# Patient Record
Sex: FEMALE | Race: WHITE | Employment: FULL TIME | URBAN - METROPOLITAN AREA
[De-identification: names, ages, dates, MRNs, and addresses within clinical notes are randomized per-mention and may not be internally consistent; named-entity substitution may affect disease eponyms.]

---

## 2019-01-15 ENCOUNTER — HOSPITAL ENCOUNTER (INPATIENT)
Facility: CLINIC | Age: 54
LOS: 4 days | Discharge: HOME | DRG: 853 | End: 2019-01-19
Attending: EMERGENCY MEDICINE | Admitting: INTERNAL MEDICINE
Payer: COMMERCIAL

## 2019-01-15 DIAGNOSIS — N17.9 ACUTE RENAL FAILURE, UNSPECIFIED ACUTE RENAL FAILURE TYPE (H): ICD-10-CM

## 2019-01-15 DIAGNOSIS — I10 BENIGN ESSENTIAL HYPERTENSION: Primary | ICD-10-CM

## 2019-01-15 DIAGNOSIS — A41.9 SEPSIS, DUE TO UNSPECIFIED ORGANISM: ICD-10-CM

## 2019-01-15 DIAGNOSIS — E86.0 DEHYDRATION: ICD-10-CM

## 2019-01-15 DIAGNOSIS — N20.0 CALCULUS OF KIDNEY: ICD-10-CM

## 2019-01-15 DIAGNOSIS — K52.9 ENTERITIS: ICD-10-CM

## 2019-01-15 DIAGNOSIS — E87.6 HYPOKALEMIA: ICD-10-CM

## 2019-01-15 LAB
ALBUMIN SERPL-MCNC: 2.9 G/DL (ref 3.4–5)
ALBUMIN UR-MCNC: 100 MG/DL
ALBUMIN UR-MCNC: 30 MG/DL
ALP SERPL-CCNC: 189 U/L (ref 40–150)
ALT SERPL W P-5'-P-CCNC: 30 U/L (ref 0–50)
ANION GAP SERPL CALCULATED.3IONS-SCNC: 14 MMOL/L (ref 6–17)
ANION GAP SERPL CALCULATED.3IONS-SCNC: 9 MMOL/L (ref 3–14)
APPEARANCE UR: ABNORMAL
APPEARANCE UR: ABNORMAL
AST SERPL W P-5'-P-CCNC: 31 U/L (ref 0–45)
BACTERIA #/AREA URNS HPF: ABNORMAL /HPF
BACTERIA #/AREA URNS HPF: ABNORMAL /HPF
BASOPHILS # BLD AUTO: 0 10E9/L (ref 0–0.2)
BASOPHILS NFR BLD AUTO: 0.2 %
BILIRUB SERPL-MCNC: 0.6 MG/DL (ref 0.2–1.3)
BILIRUB UR QL STRIP: NEGATIVE
BILIRUB UR QL STRIP: NEGATIVE
BUN SERPL-MCNC: 35 MG/DL (ref 7–30)
BUN SERPL-MCNC: 37 MG/DL (ref 7–30)
C DIFF TOX B STL QL: NEGATIVE
CA-I BLD-SCNC: 4.6 MG/DL (ref 4.4–5.2)
CALCIUM SERPL-MCNC: 9.2 MG/DL (ref 8.5–10.1)
CHLORIDE BLD-SCNC: 99 MMOL/L (ref 94–109)
CHLORIDE SERPL-SCNC: 101 MMOL/L (ref 94–109)
CO2 BLD-SCNC: 22 MMOL/L (ref 20–32)
CO2 BLDCOV-SCNC: 19 MMOL/L (ref 21–28)
CO2 SERPL-SCNC: 22 MMOL/L (ref 20–32)
COLOR UR AUTO: ABNORMAL
COLOR UR AUTO: YELLOW
CREAT BLD-MCNC: 2.1 MG/DL (ref 0.52–1.04)
CREAT SERPL-MCNC: 2.12 MG/DL (ref 0.52–1.04)
DIFFERENTIAL METHOD BLD: ABNORMAL
EOSINOPHIL # BLD AUTO: 0 10E9/L (ref 0–0.7)
EOSINOPHIL NFR BLD AUTO: 0 %
ERYTHROCYTE [DISTWIDTH] IN BLOOD BY AUTOMATED COUNT: 15.4 % (ref 10–15)
GFR SERPL CREATININE-BSD FRML MDRD: 25 ML/MIN/{1.73_M2}
GFR SERPL CREATININE-BSD FRML MDRD: 26 ML/MIN/{1.73_M2}
GLUCOSE BLD-MCNC: 166 MG/DL (ref 70–99)
GLUCOSE SERPL-MCNC: 151 MG/DL (ref 70–99)
GLUCOSE UR STRIP-MCNC: NEGATIVE MG/DL
GLUCOSE UR STRIP-MCNC: NEGATIVE MG/DL
GRAN CASTS #/AREA URNS LPF: 39 /LPF
HCG SERPL QL: NEGATIVE
HCT VFR BLD AUTO: 38.4 % (ref 35–47)
HCT VFR BLD CALC: 41 %PCV (ref 35–47)
HGB BLD CALC-MCNC: 13.9 G/DL (ref 11.7–15.7)
HGB BLD-MCNC: 12.5 G/DL (ref 11.7–15.7)
HGB UR QL STRIP: ABNORMAL
HGB UR QL STRIP: ABNORMAL
HYALINE CASTS #/AREA URNS LPF: 10 /LPF (ref 0–2)
IMM GRANULOCYTES # BLD: 0.1 10E9/L (ref 0–0.4)
IMM GRANULOCYTES NFR BLD: 0.8 %
KETONES UR STRIP-MCNC: NEGATIVE MG/DL
KETONES UR STRIP-MCNC: NEGATIVE MG/DL
LACTATE BLD-SCNC: 2.4 MMOL/L (ref 0.7–2.1)
LEUKOCYTE ESTERASE UR QL STRIP: ABNORMAL
LEUKOCYTE ESTERASE UR QL STRIP: NEGATIVE
LYMPHOCYTES # BLD AUTO: 0.3 10E9/L (ref 0.8–5.3)
LYMPHOCYTES NFR BLD AUTO: 1.8 %
MCH RBC QN AUTO: 27.1 PG (ref 26.5–33)
MCHC RBC AUTO-ENTMCNC: 32.6 G/DL (ref 31.5–36.5)
MCV RBC AUTO: 83 FL (ref 78–100)
MONOCYTES # BLD AUTO: 0.6 10E9/L (ref 0–1.3)
MONOCYTES NFR BLD AUTO: 4 %
MUCOUS THREADS #/AREA URNS LPF: PRESENT /LPF
MUCOUS THREADS #/AREA URNS LPF: PRESENT /LPF
NEUTROPHILS # BLD AUTO: 12.9 10E9/L (ref 1.6–8.3)
NEUTROPHILS NFR BLD AUTO: 93.2 %
NITRATE UR QL: NEGATIVE
NITRATE UR QL: NEGATIVE
NRBC # BLD AUTO: 0 10*3/UL
NRBC BLD AUTO-RTO: 0 /100
PCO2 BLDV: 29 MM HG (ref 40–50)
PH BLDV: 7.43 PH (ref 7.32–7.43)
PH UR STRIP: 5 PH (ref 5–7)
PH UR STRIP: 5 PH (ref 5–7)
PLATELET # BLD AUTO: 279 10E9/L (ref 150–450)
PO2 BLDV: 32 MM HG (ref 25–47)
POTASSIUM BLD-SCNC: 3.1 MMOL/L (ref 3.4–5.3)
POTASSIUM SERPL-SCNC: 3.3 MMOL/L (ref 3.4–5.3)
PROT SERPL-MCNC: 7.7 G/DL (ref 6.8–8.8)
RBC # BLD AUTO: 4.61 10E12/L (ref 3.8–5.2)
RBC #/AREA URNS AUTO: 34 /HPF (ref 0–2)
RBC #/AREA URNS AUTO: <1 /HPF (ref 0–2)
SAO2 % BLDV FROM PO2: 66 %
SODIUM BLD-SCNC: 135 MMOL/L (ref 133–144)
SODIUM SERPL-SCNC: 132 MMOL/L (ref 133–144)
SOURCE: ABNORMAL
SOURCE: ABNORMAL
SP GR UR STRIP: 1.01 (ref 1–1.03)
SP GR UR STRIP: 1.02 (ref 1–1.03)
SPECIMEN SOURCE: NORMAL
SQUAMOUS #/AREA URNS AUTO: 2 /HPF (ref 0–1)
SQUAMOUS #/AREA URNS AUTO: 32 /HPF (ref 0–1)
UROBILINOGEN UR STRIP-MCNC: 0 MG/DL (ref 0–2)
UROBILINOGEN UR STRIP-MCNC: NEGATIVE MG/DL (ref 0–2)
WBC # BLD AUTO: 13.9 10E9/L (ref 4–11)
WBC #/AREA URNS AUTO: 10 /HPF (ref 0–5)
WBC #/AREA URNS AUTO: >182 /HPF (ref 0–5)
WBC CLUMPS #/AREA URNS HPF: PRESENT /HPF

## 2019-01-15 PROCEDURE — 83605 ASSAY OF LACTIC ACID: CPT

## 2019-01-15 PROCEDURE — 87186 SC STD MICRODIL/AGAR DIL: CPT | Performed by: EMERGENCY MEDICINE

## 2019-01-15 PROCEDURE — 25000128 H RX IP 250 OP 636: Performed by: EMERGENCY MEDICINE

## 2019-01-15 PROCEDURE — 25000125 ZZHC RX 250: Performed by: INTERNAL MEDICINE

## 2019-01-15 PROCEDURE — 96361 HYDRATE IV INFUSION ADD-ON: CPT

## 2019-01-15 PROCEDURE — 81001 URINALYSIS AUTO W/SCOPE: CPT | Performed by: EMERGENCY MEDICINE

## 2019-01-15 PROCEDURE — 87493 C DIFF AMPLIFIED PROBE: CPT | Performed by: EMERGENCY MEDICINE

## 2019-01-15 PROCEDURE — 84703 CHORIONIC GONADOTROPIN ASSAY: CPT | Performed by: EMERGENCY MEDICINE

## 2019-01-15 PROCEDURE — 36415 COLL VENOUS BLD VENIPUNCTURE: CPT | Performed by: EMERGENCY MEDICINE

## 2019-01-15 PROCEDURE — 25800025 ZZH RX 258: Performed by: INTERNAL MEDICINE

## 2019-01-15 PROCEDURE — 80047 BASIC METABLC PNL IONIZED CA: CPT

## 2019-01-15 PROCEDURE — 87800 DETECT AGNT MULT DNA DIREC: CPT | Performed by: EMERGENCY MEDICINE

## 2019-01-15 PROCEDURE — 12000000 ZZH R&B MED SURG/OB

## 2019-01-15 PROCEDURE — 99223 1ST HOSP IP/OBS HIGH 75: CPT | Mod: AI | Performed by: INTERNAL MEDICINE

## 2019-01-15 PROCEDURE — 85014 HEMATOCRIT: CPT

## 2019-01-15 PROCEDURE — 96360 HYDRATION IV INFUSION INIT: CPT

## 2019-01-15 PROCEDURE — 87077 CULTURE AEROBIC IDENTIFY: CPT | Performed by: EMERGENCY MEDICINE

## 2019-01-15 PROCEDURE — 82803 BLOOD GASES ANY COMBINATION: CPT

## 2019-01-15 PROCEDURE — 87506 IADNA-DNA/RNA PROBE TQ 6-11: CPT | Performed by: EMERGENCY MEDICINE

## 2019-01-15 PROCEDURE — 87040 BLOOD CULTURE FOR BACTERIA: CPT | Performed by: EMERGENCY MEDICINE

## 2019-01-15 PROCEDURE — 85025 COMPLETE CBC W/AUTO DIFF WBC: CPT | Performed by: EMERGENCY MEDICINE

## 2019-01-15 PROCEDURE — 81001 URINALYSIS AUTO W/SCOPE: CPT | Performed by: INTERNAL MEDICINE

## 2019-01-15 PROCEDURE — 99285 EMERGENCY DEPT VISIT HI MDM: CPT | Mod: 25

## 2019-01-15 PROCEDURE — 25000132 ZZH RX MED GY IP 250 OP 250 PS 637: Performed by: EMERGENCY MEDICINE

## 2019-01-15 PROCEDURE — 80053 COMPREHEN METABOLIC PANEL: CPT | Performed by: EMERGENCY MEDICINE

## 2019-01-15 RX ORDER — SODIUM CHLORIDE, SODIUM LACTATE, POTASSIUM CHLORIDE, CALCIUM CHLORIDE 600; 310; 30; 20 MG/100ML; MG/100ML; MG/100ML; MG/100ML
1000 INJECTION, SOLUTION INTRAVENOUS CONTINUOUS
Status: DISCONTINUED | OUTPATIENT
Start: 2019-01-15 | End: 2019-01-15

## 2019-01-15 RX ORDER — ONDANSETRON 4 MG/1
4 TABLET, ORALLY DISINTEGRATING ORAL EVERY 6 HOURS PRN
Status: DISCONTINUED | OUTPATIENT
Start: 2019-01-15 | End: 2019-01-19 | Stop reason: HOSPADM

## 2019-01-15 RX ORDER — LISINOPRIL 10 MG/1
10 TABLET ORAL DAILY
Status: ON HOLD | COMMUNITY
Start: 2018-12-04 | End: 2019-01-19

## 2019-01-15 RX ORDER — ACETAMINOPHEN 325 MG/1
650 TABLET ORAL ONCE
Status: COMPLETED | OUTPATIENT
Start: 2019-01-15 | End: 2019-01-15

## 2019-01-15 RX ORDER — PROCHLORPERAZINE 25 MG
25 SUPPOSITORY, RECTAL RECTAL EVERY 12 HOURS PRN
Status: DISCONTINUED | OUTPATIENT
Start: 2019-01-15 | End: 2019-01-19 | Stop reason: HOSPADM

## 2019-01-15 RX ORDER — DEXTROSE MONOHYDRATE, SODIUM CHLORIDE, AND POTASSIUM CHLORIDE 50; 1.49; 4.5 G/1000ML; G/1000ML; G/1000ML
INJECTION, SOLUTION INTRAVENOUS CONTINUOUS
Status: DISCONTINUED | OUTPATIENT
Start: 2019-01-15 | End: 2019-01-17

## 2019-01-15 RX ORDER — ACETAMINOPHEN 325 MG/1
650 TABLET ORAL EVERY 4 HOURS PRN
Status: DISCONTINUED | OUTPATIENT
Start: 2019-01-15 | End: 2019-01-19 | Stop reason: HOSPADM

## 2019-01-15 RX ORDER — MULTIVIT-MIN/IRON/FOLIC ACID/K 18-600-40
1 CAPSULE ORAL DAILY
Status: ON HOLD | COMMUNITY
End: 2019-01-19

## 2019-01-15 RX ORDER — NALOXONE HYDROCHLORIDE 0.4 MG/ML
.1-.4 INJECTION, SOLUTION INTRAMUSCULAR; INTRAVENOUS; SUBCUTANEOUS
Status: DISCONTINUED | OUTPATIENT
Start: 2019-01-15 | End: 2019-01-19 | Stop reason: HOSPADM

## 2019-01-15 RX ORDER — ONDANSETRON 2 MG/ML
4 INJECTION INTRAMUSCULAR; INTRAVENOUS EVERY 6 HOURS PRN
Status: DISCONTINUED | OUTPATIENT
Start: 2019-01-15 | End: 2019-01-19 | Stop reason: HOSPADM

## 2019-01-15 RX ORDER — PROCHLORPERAZINE MALEATE 5 MG
10 TABLET ORAL EVERY 6 HOURS PRN
Status: DISCONTINUED | OUTPATIENT
Start: 2019-01-15 | End: 2019-01-19 | Stop reason: HOSPADM

## 2019-01-15 RX ORDER — POTASSIUM CL/LIDO/0.9 % NACL 10MEQ/0.1L
10 INTRAVENOUS SOLUTION, PIGGYBACK (ML) INTRAVENOUS
Status: DISCONTINUED | OUTPATIENT
Start: 2019-01-15 | End: 2019-01-19 | Stop reason: HOSPADM

## 2019-01-15 RX ORDER — LIDOCAINE 40 MG/G
CREAM TOPICAL
Status: DISCONTINUED | OUTPATIENT
Start: 2019-01-15 | End: 2019-01-19 | Stop reason: HOSPADM

## 2019-01-15 RX ADMIN — SODIUM CHLORIDE, POTASSIUM CHLORIDE, SODIUM LACTATE AND CALCIUM CHLORIDE 1000 ML: 600; 310; 30; 20 INJECTION, SOLUTION INTRAVENOUS at 13:00

## 2019-01-15 RX ADMIN — SODIUM CHLORIDE, POTASSIUM CHLORIDE, SODIUM LACTATE AND CALCIUM CHLORIDE 600 ML: 600; 310; 30; 20 INJECTION, SOLUTION INTRAVENOUS at 15:29

## 2019-01-15 RX ADMIN — POTASSIUM CHLORIDE, DEXTROSE MONOHYDRATE AND SODIUM CHLORIDE: 150; 5; 450 INJECTION, SOLUTION INTRAVENOUS at 20:15

## 2019-01-15 RX ADMIN — SODIUM CHLORIDE, POTASSIUM CHLORIDE, SODIUM LACTATE AND CALCIUM CHLORIDE 1000 ML: 600; 310; 30; 20 INJECTION, SOLUTION INTRAVENOUS at 12:58

## 2019-01-15 RX ADMIN — FAMOTIDINE 20 MG: 10 INJECTION, SOLUTION INTRAVENOUS at 20:16

## 2019-01-15 RX ADMIN — ACETAMINOPHEN 650 MG: 325 TABLET, FILM COATED ORAL at 14:03

## 2019-01-15 ASSESSMENT — ENCOUNTER SYMPTOMS
ABDOMINAL PAIN: 0
DIARRHEA: 1
APPETITE CHANGE: 1
FEVER: 0
CHILLS: 1
DIZZINESS: 1
BLOOD IN STOOL: 0
NAUSEA: 1
VOMITING: 1
DYSURIA: 0

## 2019-01-15 ASSESSMENT — MIFFLIN-ST. JEOR
SCORE: 1506.26
SCORE: 1559.34

## 2019-01-15 ASSESSMENT — ACTIVITIES OF DAILY LIVING (ADL): ADLS_ACUITY_SCORE: 12

## 2019-01-15 NOTE — PHARMACY-ADMISSION MEDICATION HISTORY
Admission medication history interview status for this patient is complete. See [facility] admission navigator for allergy information, prior to admission medications and immunization status.     Medication history interview source(s):Patient  Medication history resources (including written lists, pill bottles, clinic record):None  Primary pharmacy: [facility]     Changes made to PTA medication list:  Added: none  Deleted: none  Changed: vitamin D from 5000 international unit(s) to 1000 international unit(s)     Actions taken by pharmacist (provider contacted, etc):None     Additional medication history information:None    Medication reconciliation/reorder completed by provider prior to medication history? No    Do you take OTC medications (eg tylenol, ibuprofen, fish oil, eye/ear drops, etc)? Y (Y/N)    For patients on insulin therapy: N (Y/N)    Prior to Admission medications    Medication Sig Last Dose Taking? Auth Provider   Blood Pressure Monitor KIT Use to check blood pressure daily for 1-2 weeks  Yes Reported, Patient   lisinopril (PRINIVIL/ZESTRIL) 10 MG tablet Take 10 mg by mouth daily  1/15/2019 at am Yes Reported, Patient   Vitamin D, Cholecalciferol, 1000 units TABS Take 1 tablet by mouth daily 1/15/2019 at am Yes Unknown, Entered By History

## 2019-01-15 NOTE — ED PROVIDER NOTES
History     Chief Complaint:  Fever and Diarrhea    HPI   Bre Caballero is a 53 year old female who presents to the emergency department today with fever and diarrhea. Patient has been having diarrhea for the last 4 days. She had nausea and vomiting for the first 2 days that has since resolved. She has continued to have diarrhea, with associated chills. She has had a decreased appetite. Patient has been having some heart-racing sensations and dizziness. She denies abdominal pain, urinary symptoms, blood in vomit or stool. She denies recent travel, recent antibiotics,  concerning foods, or ill contacts.  Ibuprofen since last night. Patient still gets periods and has not missed any.     Allergies:  No Known Drug Allergies     Medications:    Blood pressure monitor kit  Vitamin D3  Prinivil     Past Medical History:    Hypertension    Past Surgical History:    Cholecystectomy     Family History:    History reviewed. No pertinent family history.     Social History:  The patient was accompanied to the ED by .  Smoking Status: Never  Smokeless Tobacco: Never  Alcohol Use: Yes   Marital Status:       Review of Systems   Constitutional: Positive for appetite change and chills. Negative for fever.   Gastrointestinal: Positive for diarrhea, nausea (since resolved) and vomiting (since resolved). Negative for abdominal pain and blood in stool.   Genitourinary: Negative for dysuria.   Neurological: Positive for dizziness.   All other systems reviewed and are negative.      Physical Exam     Patient Vitals for the past 24 hrs:   BP Temp Temp src Pulse Heart Rate Resp SpO2 Height Weight   01/15/19 1445 -- -- -- -- -- -- 98 % -- --   01/15/19 1430 106/56 -- -- 114 -- -- 98 % -- --   01/15/19 1403 -- 102  F (38.9  C) -- -- -- -- -- -- --   01/15/19 1331 123/67 -- -- 117 117 -- 97 % -- --   01/15/19 1308 116/64 -- -- 127 -- -- 98 % -- --   01/15/19 1303 -- -- -- -- -- -- 100 % -- --   01/15/19 1230 104/62 -- -- 120  "-- -- -- -- --   01/15/19 1206 105/64 99.2  F (37.3  C) Oral 134 134 20 97 % 1.676 m (5' 6\") 88.5 kg (195 lb)      Physical Exam    Nursing note and vitals reviewed.    Constitutional: Pleasant and well groomed.          HENT:    Mouth/Throat: Oropharynx is without swelling or erythema. Oral mucosa moist.    Eyes: Conjunctivae are normal. No scleral icterus.    Neck: Neck supple.   Cardiovascular: Tachycardic, regular rhythm and intact distal pulses.    Pulmonary/Chest: Effort normal and breath sounds normal.   Abdominal: Soft.  No distension. There is no tenderness.   Musculoskeletal:  No edema, No calf tenderness  Neurological:Alertand oriented. Coordination normal.   Skin: Skin is warm and dry.   Psychiatric: Normal mood and affect.     Emergency Department Course   Laboratory:  Laboratory findings were communicated with the patient and family who voiced understanding of the findings.  Blood cultures: Pending   C.Diff: Pending  Enteric bacteria and virus panel by [facility] stool: Pending  ISTAT BMP: 3.1 K, 166 Glucose, 35 urea nitrogen, 25 GFR, WNL (Creatinine 2.1)   UA: kaitlin, cloudy urine with moderate blood, 100 protein albumin, large Leukocyte esterase, >182 WBC, 34 RBC, WBC clumps present, many bacteria, 32 Squamous Epithelial, mucous present, 10 hyaline casts, 39 granular casts o/w WNL   HCG Qualitative: negative   CMP: 132 Na, 3.3 K, 151 Glucose, 37 urea nitrogen, 26 GFR, 2.9 Albumin, 189 Alkaline Phosphatase o/w WNL (Creatinine 2.12)   ISTAT gases lactate alicia POCT: pH: 7.43, PCO2: 29, PO2: 32, Bicarbonate: 19, O2 Sat: 66, Lactic acid: 2.4   CBC: AWNL (WBC 13.9, HGB 12.5, )     Interventions:  1258: Lactated ringers 1L IV Bolus   1300: Lactated ringers 1L IV Bolus   1403: Tylenol 650 mg PO   1429: Lactated ringers 1L IV Bolus   1529: Lactated ringers 600mL IV     Emergency Department Course:  Nursing notes and vitals reviewed.  1225: I performed an exam of the patient as documented above.   IV was " inserted and blood was drawn for laboratory testing, results above.  The patient provided a urine sample here in the emergency department. This was sent for laboratory testing, findings above.  1423: Patient rechecked and updated.    1509:Findings and plan explained to the Patient and spouse who consents to admission. Discussed the patient with Dr. Baez, who will admit the patient to a Med/Surg bed for further monitoring, evaluation, and treatment.   I personally reviewed the laboratory results with the Patient and spouse and answered all related questions prior to admission.     Impression & Plan    CMS Diagnoses: The Lactic acid level is elevated due to dehydration vs. bacterial infection/sepsis, at this time there is no sign of severe sepsis or septic shock.   Medical Decision Making:  Bre Caballero is a 53 year old, generally healthy female who presents with complaints of reported fevers, chills, initially had vomiting and diarrhea, but has had persistent diarrhea. She arrived tachycardic, but afebrile. Differential diagnosis was broad and included but was not limited to sepsis, colitis, dehydration, electrolyte abnormality. ED evaluation is as noted above. Remarkable for elevated lactic acid, elevated white blood cell count, and elevated creatinine from baseline. She had some mild hypokalemia. She did spike a fever again to 102 in the ED. She continues to be well appearing with a benign abdominal exam, but with the lab abnormalities, 4th day of fever, I feel that admission for ongoing evaluation and management is indicated. Cultures have been obtained. Although she meets sepsis criteria, I am reluctant to start antibiotics as the diarrhea seems to be the only other symptom and C.diff colitis is on the differential, although she does not have known risks for this. I spoke with Dr. Baez who has accepted the patient for admission for ongoing evaluation and management.     Diagnosis:    ICD-10-CM    1.  Sepsis, due to unspecified organism (H) A41.9 UA with Microscopic     Enteric Bacteria and Virus Panel by [facility] Stool     Clostridium difficile toxin B PCR     Blood culture     Blood culture   2. Acute renal failure, unspecified acute renal failure type (H) N17.9    3. Enteritis K52.9    4. Dehydration E86.0    5. Hypokalemia E87.6        Disposition:  Admitted to Med/Surg    Scribe Disclosure:  Juana FREEMAN, am serving as a scribe at 12:29 PM on 1/15/2019 to document services personally performed by Skyla Khan based on my observations and the provider's statements to me.    1/15/2019   [facility] EMERGENCY DEPARTMENT       Skyla Khan MD  01/16/19 1937

## 2019-01-15 NOTE — ED NOTES
[facility]  ED Nurse Handoff Report    Bre Caballero is a 53 year old female   ED Chief complaint: Fever and Diarrhea  . ED Diagnosis:   Final diagnoses:   Sepsis, due to unspecified organism (H)   Acute renal failure, unspecified acute renal failure type (H)   Enteritis   Dehydration   Hypokalemia     Allergies: No Known Allergies    Code Status: Full Code  Activity level - Baseline/Home:  Independent. Activity Level - Current:   Stand with Assist. Lift room needed: No. Bariatric: No   Needed: No   Isolation: Yes. Infection: Not Applicable  C-Diff Pending.     Vital Signs:   Vitals:    01/15/19 1308 01/15/19 1331 01/15/19 1403 01/15/19 1430   BP: 116/64 123/67  106/56   Pulse: 127 117  114   Resp:       Temp:   102  F (38.9  C)    TempSrc:       SpO2: 98% 97%  98%   Weight:       Height:           Cardiac Rhythm:  ,      Pain level:    Patient confused: No. Patient Falls Risk: Yes.   Elimination Status: Has voided   Patient Report - Initial Complaint: Diarrhea. Focused Assessment: A&O. Up with stand by assist. Presents with 5 days of N/V/D. Nausea and vomiting now resolved, diarrhea continues. Feels weak and dizzy. Tolerating po fluids only at home. Feeling slightly improved after 2L IVF. Fever of 102, tylenol given   Tests Performed: Labs. Abnormal Results:   Labs Ordered and Resulted from Time of ED Arrival Up to the Time of Departure from the ED   CBC WITH PLATELETS DIFFERENTIAL - Abnormal; Notable for the following components:       Result Value    WBC 13.9 (*)     RDW 15.4 (*)     Absolute Neutrophil 12.9 (*)     Absolute Lymphocytes 0.3 (*)     All other components within normal limits   COMPREHENSIVE METABOLIC PANEL - Abnormal; Notable for the following components:    Sodium 132 (*)     Potassium 3.3 (*)     Glucose 151 (*)     Urea Nitrogen 37 (*)     Creatinine 2.12 (*)     GFR Estimate 26 (*)     GFR Estimate If Black 30 (*)     Albumin 2.9 (*)     Alkaline Phosphatase 189 (*)      All other components within normal limits   ISTAT BASIC MET ICA HCT POCT - Abnormal; Notable for the following components:    Potassium 3.1 (*)     Glucose 166 (*)     Urea Nitrogen 35 (*)     Creatinine 2.1 (*)     GFR Estimate 25 (*)     GFR Estimate If Black 30 (*)     All other components within normal limits   ISTAT  GASES LACTATE EUGENIA POCT - Abnormal; Notable for the following components:    PCO2 Venous 29 (*)     Bicarbonate Venous 19 (*)     Lactic Acid 2.4 (*)     All other components within normal limits   HCG QUALITATIVE   ROUTINE UA WITH MICROSCOPIC   PERIPHERAL IV CATHETER   ISTAT GAS OR ELECTROLYTE NURSING POCT   ISTAT CG4 GASES LACTATE EUGENIA NURSING POCT   CARDIAC CONTINUOUS MONITORING   BLOOD CULTURE   BLOOD CULTURE   ENTERIC BACTERIA AND VIRUS PANEL BY [facility] STOOL   CLOSTRIDIUM DIFFICILE TOXIN B     .   Treatments provided: IVF, tylenol   Family Comments:  at bedside  OBS brochure/video discussed/provided to patient:  No  ED Medications:   Medications   lidocaine 1 % 1 mL (not administered)   lidocaine (LMX4) cream (not administered)   sodium chloride (PF) 0.9% PF flush 3 mL (not administered)   sodium chloride (PF) 0.9% PF flush 3 mL (not administered)   lactated ringers BOLUS 1,000 mL (1,000 mLs Intravenous New Bag 1/15/19 1300)     Followed by   lactated ringers BOLUS 1,000 mL (0 mLs Intravenous Stopped 1/15/19 1353)     Followed by   lactated ringers infusion (1,000 mLs Intravenous Not Given 1/15/19 1429)   potassium chloride 10 mEq in 100 mL intermittent infusion with 10 mg lidocaine (not administered)   lactated ringers BOLUS 600 mL (not administered)     Followed by   lactated ringers infusion (not administered)   acetaminophen (TYLENOL) tablet 650 mg (650 mg Oral Given 1/15/19 1403)     Drips infusing:  No  For the majority of the shift, the patient's behavior Green. Interventions performed were NA.     Severe Sepsis OR Septic Shock Diagnosis Present: No      ED Nurse Name/Phone Number:  Ivy Villafuerte,   3:22 PM  RECEIVING UNIT ED HANDOFF REVIEW    Above ED Nurse Handoff Report was reviewed: Yes  Reviewed by: Deanna Rodrigues on January 15, 2019 at 6:09 PM

## 2019-01-15 NOTE — ED TRIAGE NOTES
Pt reports fever, chills, and diarrhea since Saturday. Pt has not been able to eat/drink much since then. Pt has also been vomiting but not today. Pt denies any pain. Pt states she has also been very shaky and lightheaded today, pt thinks she passed out at one point while she was lying on the couch this morning. Last Advil was 11:30 pm.

## 2019-01-16 LAB
ANION GAP SERPL CALCULATED.3IONS-SCNC: 6 MMOL/L (ref 3–14)
BASOPHILS # BLD AUTO: 0.1 10E9/L (ref 0–0.2)
BASOPHILS NFR BLD AUTO: 0.4 %
BUN SERPL-MCNC: 21 MG/DL (ref 7–30)
C COLI+JEJUNI+LARI FUSA STL QL NAA+PROBE: NOT DETECTED
CALCIUM SERPL-MCNC: 8.8 MG/DL (ref 8.5–10.1)
CHLORIDE SERPL-SCNC: 104 MMOL/L (ref 94–109)
CO2 SERPL-SCNC: 27 MMOL/L (ref 20–32)
CREAT SERPL-MCNC: 1.37 MG/DL (ref 0.52–1.04)
DIFFERENTIAL METHOD BLD: ABNORMAL
EC STX1 GENE STL QL NAA+PROBE: NOT DETECTED
EC STX2 GENE STL QL NAA+PROBE: NOT DETECTED
ENTERIC PATHOGEN COMMENT: NORMAL
EOSINOPHIL # BLD AUTO: 0.1 10E9/L (ref 0–0.7)
EOSINOPHIL NFR BLD AUTO: 0.3 %
ERYTHROCYTE [DISTWIDTH] IN BLOOD BY AUTOMATED COUNT: 15.5 % (ref 10–15)
GFR SERPL CREATININE-BSD FRML MDRD: 44 ML/MIN/{1.73_M2}
GLUCOSE SERPL-MCNC: 146 MG/DL (ref 70–99)
HCT VFR BLD AUTO: 35.4 % (ref 35–47)
HGB BLD-MCNC: 11.5 G/DL (ref 11.7–15.7)
IMM GRANULOCYTES # BLD: 0.1 10E9/L (ref 0–0.4)
IMM GRANULOCYTES NFR BLD: 0.8 %
LACTATE BLD-SCNC: 1 MMOL/L (ref 0.7–2)
LYMPHOCYTES # BLD AUTO: 0.9 10E9/L (ref 0.8–5.3)
LYMPHOCYTES NFR BLD AUTO: 5.9 %
MCH RBC QN AUTO: 27.2 PG (ref 26.5–33)
MCHC RBC AUTO-ENTMCNC: 32.5 G/DL (ref 31.5–36.5)
MCV RBC AUTO: 84 FL (ref 78–100)
MONOCYTES # BLD AUTO: 1.7 10E9/L (ref 0–1.3)
MONOCYTES NFR BLD AUTO: 11.5 %
NEUTROPHILS # BLD AUTO: 11.8 10E9/L (ref 1.6–8.3)
NEUTROPHILS NFR BLD AUTO: 81.1 %
NOROV GI+II ORF1-ORF2 JNC STL QL NAA+PR: NOT DETECTED
NRBC # BLD AUTO: 0 10*3/UL
NRBC BLD AUTO-RTO: 0 /100
PLATELET # BLD AUTO: 270 10E9/L (ref 150–450)
POTASSIUM SERPL-SCNC: 4.2 MMOL/L (ref 3.4–5.3)
RBC # BLD AUTO: 4.23 10E12/L (ref 3.8–5.2)
RVA NSP5 STL QL NAA+PROBE: NOT DETECTED
SALMONELLA SP RPOD STL QL NAA+PROBE: NOT DETECTED
SHIGELLA SP+EIEC IPAH STL QL NAA+PROBE: NOT DETECTED
SODIUM SERPL-SCNC: 137 MMOL/L (ref 133–144)
V CHOL+PARA RFBL+TRKH+TNAA STL QL NAA+PR: NOT DETECTED
WBC # BLD AUTO: 14.5 10E9/L (ref 4–11)
Y ENTERO RECN STL QL NAA+PROBE: NOT DETECTED

## 2019-01-16 PROCEDURE — 25800025 ZZH RX 258: Performed by: INTERNAL MEDICINE

## 2019-01-16 PROCEDURE — 25000125 ZZHC RX 250: Performed by: INTERNAL MEDICINE

## 2019-01-16 PROCEDURE — 25000132 ZZH RX MED GY IP 250 OP 250 PS 637: Performed by: INTERNAL MEDICINE

## 2019-01-16 PROCEDURE — 25000128 H RX IP 250 OP 636: Performed by: INTERNAL MEDICINE

## 2019-01-16 PROCEDURE — 99233 SBSQ HOSP IP/OBS HIGH 50: CPT | Performed by: INTERNAL MEDICINE

## 2019-01-16 PROCEDURE — 83605 ASSAY OF LACTIC ACID: CPT | Performed by: INTERNAL MEDICINE

## 2019-01-16 PROCEDURE — 85025 COMPLETE CBC W/AUTO DIFF WBC: CPT | Performed by: INTERNAL MEDICINE

## 2019-01-16 PROCEDURE — 12000000 ZZH R&B MED SURG/OB

## 2019-01-16 PROCEDURE — 36415 COLL VENOUS BLD VENIPUNCTURE: CPT | Performed by: INTERNAL MEDICINE

## 2019-01-16 PROCEDURE — 80048 BASIC METABOLIC PNL TOTAL CA: CPT | Performed by: INTERNAL MEDICINE

## 2019-01-16 RX ORDER — CEFTRIAXONE 2 G/1
2 INJECTION, POWDER, FOR SOLUTION INTRAMUSCULAR; INTRAVENOUS EVERY 24 HOURS
Status: DISCONTINUED | OUTPATIENT
Start: 2019-01-16 | End: 2019-01-16

## 2019-01-16 RX ADMIN — POTASSIUM CHLORIDE, DEXTROSE MONOHYDRATE AND SODIUM CHLORIDE: 150; 5; 450 INJECTION, SOLUTION INTRAVENOUS at 03:52

## 2019-01-16 RX ADMIN — ACETAMINOPHEN 650 MG: 325 TABLET, FILM COATED ORAL at 09:54

## 2019-01-16 RX ADMIN — FAMOTIDINE 20 MG: 10 INJECTION, SOLUTION INTRAVENOUS at 05:55

## 2019-01-16 RX ADMIN — TAZOBACTAM SODIUM AND PIPERACILLIN SODIUM 3.38 G: 375; 3 INJECTION, SOLUTION INTRAVENOUS at 15:22

## 2019-01-16 RX ADMIN — CEFTRIAXONE SODIUM 2 G: 2 INJECTION, POWDER, FOR SOLUTION INTRAMUSCULAR; INTRAVENOUS at 09:07

## 2019-01-16 RX ADMIN — POTASSIUM CHLORIDE, DEXTROSE MONOHYDRATE AND SODIUM CHLORIDE: 150; 5; 450 INJECTION, SOLUTION INTRAVENOUS at 13:02

## 2019-01-16 RX ADMIN — FAMOTIDINE 20 MG: 10 INJECTION, SOLUTION INTRAVENOUS at 17:50

## 2019-01-16 RX ADMIN — TAZOBACTAM SODIUM AND PIPERACILLIN SODIUM 3.38 G: 375; 3 INJECTION, SOLUTION INTRAVENOUS at 20:02

## 2019-01-16 ASSESSMENT — ACTIVITIES OF DAILY LIVING (ADL)
ADLS_ACUITY_SCORE: 10

## 2019-01-16 NOTE — PROGRESS NOTES
[facility]  Hospitalist Progress Note  Owen Baez MD, MD 01/16/2019  (Text Page)  Reason for Stay (Diagnosis): sepsis with solated E coli suspect UTI related?         Assessment and Plan:      Summary of Stay: Bre Caballero is a 53 year old female known prior history of hypertension and was in her usual state of health until several days prior to this hospitalization when she started having nausea, vomiting, diarrhea and was admitted on 1/15/2019 with dehydration and renal failure.    Problem List:   1. Sepsis with presenting leukocytosis, fever, tachycardia, lactic acidosis now with isolated E. coli bacteremia  2. ?  UTI?  Found with pyuria and bacteriuria but denies any urinary symptomatology prior to this hospitalization.  3. Acute kidney injury secondary to #1, prerenal with dehydration, suspected acute tubular necrosis also given sepsis.  4. Hypertension    Continue inpatient care as she remains high risk for clinical deterioration.  Started on intravenous antibiotics with ceftriaxone.  Continue to monitor her cultures and adjust antibiotics as needed.  She has no prior history of recurrent UTI, no recent hospitalization or antibiotic exposure.  Low likelihood of having resistant organism.  Currently denying any abdominal pain and no further recurrence of nausea and vomiting.  Holding off for CT scan of the abdomen and pelvis with contrast given ongoing AK I.  Hold her home regimen of lisinopril.    DVT Prophylaxis: Pneumatic Compression Devices  Code Status: Full Code  Discharge Dispo: Home  Estimated Disch Date / # of Days until Disch: At least 2 more days        Interval History (Subjective):      Continuing care today.  Seen and examined.  Chart reviewed.  Case discussed with nursing service.  Current working diagnosis and plan of care discussed in detail with patient and her  in attendance at bedside.  She still having intermittent fever spikes and chills.  Denies any  "further nausea or vomiting.  No loose bowel movement overnight.  No bleeding tendencies.  No reported mental status changes.  Denies any back pain, flank pain, abdominal pain.     # Pain Assessment:  Current Pain Score 1/16/2019   Patient currently in pain? denies   Pain score (0-10) 0   Bre mcdaniel pain level was assessed and she currently denies pain.                  Physical Exam:      Last Vital Signs:  /88 (BP Location: Right arm)   Pulse 108   Temp 99.6  F (37.6  C) (Oral)   Resp 18   Ht 1.676 m (5' 6\")   Wt 93.8 kg (206 lb 11.2 oz)   SpO2 98%   BMI 33.36 kg/m      I/O last 3 completed shifts:  In: -   Out: 1100 [Urine:1100]  Wt Readings from Last 1 Encounters:   01/15/19 93.8 kg (206 lb 11.2 oz)     Vitals:    01/15/19 1206 01/15/19 1846   Weight: 88.5 kg (195 lb) 93.8 kg (206 lb 11.2 oz)       Constitutional: Awake, alert, cooperative, no apparent distress   Respiratory: Clear to auscultation bilaterally, no crackles or wheezing   Cardiovascular: Regular rate and rhythm, normal S1 and S2, and no murmur noted   Abdomen: Normal bowel sounds, soft, non-distended, non-tender   Skin: No rashes, no cyanosis, dry to touch   Neuro: Alert and oriented x3, no weakness, spontaneous and coherent speech   Extremities: No edema, normal range of motion   Other(s): Euthymic mood, not agitated       All other systems: Negative          Medications:      All current medications were reviewed with changes reflected in problem list.         Data:      All new lab and imaging data was reviewed.   Labs:  Recent Labs   Lab 01/16/19  0816 01/15/19  1239 01/15/19  1234   WBC 14.5*  --  13.9*   HGB 11.5* 13.9 12.5   HCT 35.4  --  38.4   MCV 84  --  83     --  279     Recent Labs   Lab 01/16/19  0816 01/15/19  1239 01/15/19  1234    135 132*   POTASSIUM 4.2 3.1* 3.3*   CHLORIDE 104 99 101   CO2 27  --  22   ANIONGAP 6 14 9   * 166* 151*   BUN 21 35* 37*   CR 1.37*  --  2.12*   GFRESTIMATED 44* 25* 26* "   GFRESTBLACK 51* 30* 30*   CHERI 8.8  --  9.2   PROTTOTAL  --   --  7.7   ALBUMIN  --   --  2.9*   BILITOTAL  --   --  0.6   ALKPHOS  --   --  189*   AST  --   --  31   ALT  --   --  30     Recent Labs   Lab 01/16/19  0816 01/15/19  1239 01/15/19  1234   * 166* 151*     No results for input(s): INR in the last 168 hours.  No results for input(s): TROPONIN, TROPI, TROPR in the last 168 hours.    Invalid input(s): TROP, TROPONINIES  Recent Labs   Lab 01/15/19  4965   COLOR Yellow   APPEARANCE Slightly Cloudy   URINEGLC Negative   URINEBILI Negative   URINEKETONE Negative   SG 1.014   UBLD Large*   URINEPH 5.0   PROTEIN 30*   NITRITE Negative   LEUKEST Negative   RBCU <1   WBCU 10*      Imaging:   No results found for this or any previous visit.

## 2019-01-16 NOTE — PLAN OF CARE
"/88 (BP Location: Right arm)   Pulse 108   Temp 103.9  F (39.9  C) (Oral)   Resp 20   Ht 1.676 m (5' 6\")   Wt 93.8 kg (206 lb 11.2 oz)   SpO2 98%   BMI 33.36 kg/m      Max temp 103.9po. Blood cx drawn from left arm 1/15 positive for Ecoli-started on IV Rocephin this AM. ID consult ordered. Spouse at bedside. IVF infusing-rate decreased from 125cc/hr to 100cc/hr. K+ improved with IVF. Denies nausea.     Addendum: ID consult performed. IV Rocephin dc'd and IV Zosyn started.   "

## 2019-01-16 NOTE — PROVIDER NOTIFICATION
"MD paged: \"temp 99.6 at 9:50 with shaking and chills-received  Tylenol-multiple warm blankets placed-blankets removed for some time prior to recheck-recheck temp 103.9po.\"     Addendum: return call received-continue to monitor, continue current POC. Started on IV abx today.   "

## 2019-01-16 NOTE — H&P
[facility]  Hospitalist Admission Note  Name: Bre Caballero    MRN: [id number]  YOB: 1965    Age: 53 year old  Date of admission: 1/15/2019  Primary care provider: No Ref-Primary, Physician            Assessment and Plan:   Bre Caballero is a 53 year old female prior history of hypertension and was in her usual state of health until several days prior to this hospitalization when she started having nausea, vomiting, accompanied with multiple bouts of loose bowel movement described as watery, nonbloody with increasing frequency and intensity leading for her to seek medical attention in the emergency room earlier today.    1.  Dehydration, acute gastroenteritis suspected viral in etiology  2.  Low suspicion for C. difficile with no recent antibiotic use, sick contacts, hospitalization  3.  Leukocytosis, fever, tachycardia, lactic acidosis secondary to early sepsis likely viral in etiology from #1  4.  Acute kidney injury secondary to #1 likely prerenal from dehydration and volume losses  5.  Hypokalemia from #1    Admit as inpatient.  Generous IV fluid support.  Repeat BMP in the morning.  Correct hypokalemia.  Symptomatic management for nausea and vomiting.  I am not providing any antimotility agents yet.  Enteric panel requested and pending.  Please repeat urine analysis as urine sample sent to the emergency room is a poor catch with presence of multiple squamous epithelial cells.  Holding any antibiotics administration for now.  Hold home regimen of lisinopril in the setting of current AK I.    Code status: Full code  Admit to inpatient  Prophylaxis: Ambulate and mechanical PCds if staying mostly in bed  Disposition: Home likely in 1-2 days          Chief Complaint:   Nausea, vomiting, diarrhea, lack of appetite and oral intake of several days' duration       Source of Information:   Patient with good reliability,  at bedside with fair reliability   discussion with ED  physician  Review of E chart records         History of Present Illness:   Bre Caballero is a 53 year old female prior history of hypertension and was in her usual state of health until several days prior to this hospitalization when she started having nausea, vomiting, accompanied with multiple bouts of loose bowel movement described as watery, nonbloody with increasing frequency and intensity leading for her to seek medical attention in the emergency room earlier today.  She stated no recent travel, no recent new diet, denies any recent sick exposure or contact, no recent antibiotics use.  Upon presentation in the emergency room she is found with fever spikes with T-max of 102  F, tachycardic, lactic acidosis, leukocytosis complicated with hypokalemia and accompanying acute kidney injury.  She was given with generous amount of IV fluids, symptomatic management and referred to us for further management and care hence this hospitalization.   During the time examination she is ready feeling comfortable no recurrence of any nausea, vomiting, diarrhea.  Her tachycardia has been improving and currently afebrile.  She remained pleasant, conversant, and denies any other complaints.           Past Medical History:     Past Medical History:   Diagnosis Date     Hypertension              Past Surgical History:     Past Surgical History:   Procedure Laterality Date     CHOLECYSTECTOMY               Social History:     Social History     Tobacco Use     Smoking status: Never Smoker     Smokeless tobacco: Never Used   Substance Use Topics     Alcohol use: Yes     Comment: social              Family History:   Family history was fully reviewed and non-contributory in this case.         Allergies:   No Known Allergies          Medications:     Prior to Admission medications    Medication Sig Last Dose Taking? Auth Provider   Blood Pressure Monitor KIT Use to check blood pressure daily for 1-2 weeks  Yes Reported, Patient  "  lisinopril (PRINIVIL/ZESTRIL) 10 MG tablet Take 10 mg by mouth daily  1/15/2019 at am Yes Reported, Patient   Vitamin D, Cholecalciferol, 1000 units TABS Take 1 tablet by mouth daily 1/15/2019 at am Yes Unknown, Entered By History             Review of Systems:   A Comprehensive greater than 10 system review of systems was carried out.  Pertinent positives and negatives are noted above.  Otherwise negative for contributory information.           Physical Exam:   Blood pressure 113/66, pulse 97, temperature 97.4  F (36.3  C), temperature source Oral, resp. rate 18, height 1.676 m (5' 6\"), weight 93.8 kg (206 lb 11.2 oz), SpO2 97 %.  Wt Readings from Last 1 Encounters:   01/15/19 93.8 kg (206 lb 11.2 oz)     Exam:  GENERAL: No apparent distress. Awake, alert, and fully oriented.  HEENT: Normocephalic, atraumatic. Extraocular movements intact.  CARDIOVASCULAR: Regular rate and rhythm without murmurs or rubs. No JVD  PULMONARY: Clear to auscultation, no wheezes, crackles  ABDOMINAL: Soft, non-tender, non-distended. Bowel sounds normoactive. No hepatosplenomegaly.  EXTREMITIES: No cyanosis or clubbing. No edema.  NEUROLOGICAL: CN 2-12 grossly intact, awake and alert x3, spontaneous and coherent speech. no focal neurological deficits.  DERMATOLOGICAL: No rash, ulcer, ecchymoses, jaundice.  Psych: not agitation, not combative, pleasant mood  Lymph nodes: no obvious palpable  cervical or axillary lymphadenopathy         Data:   EKG: None seen    Imaging:  No results found for this or any previous visit (from the past 48 hour(s)).    Labs:  Recent Labs   Lab 01/15/19  1505 01/15/19  1232   CULT No growth after 3 hours No growth after 1 hour     Recent Labs   Lab 01/15/19  1239 01/15/19  1234   WBC  --  13.9*   HGB 13.9 12.5   HCT  --  38.4   MCV  --  83   PLT  --  279     Recent Labs   Lab 01/15/19  1239 01/15/19  1234    132*   POTASSIUM 3.1* 3.3*   CHLORIDE 99 101   CO2  --  22   ANIONGAP 14 9   * 151*   BUN " 35* 37*   CR  --  2.12*   GFRESTIMATED 25* 26*   GFRESTBLACK 30* 30*   CHERI  --  9.2   PROTTOTAL  --  7.7   ALBUMIN  --  2.9*   BILITOTAL  --  0.6   ALKPHOS  --  189*   AST  --  31   ALT  --  30     No results for input(s): SED, CRP in the last 168 hours.  Recent Labs   Lab 01/15/19  1239 01/15/19  1234   * 151*     No results for input(s): INR in the last 168 hours.  No results for input(s): LIPASE in the last 168 hours.  No results for input(s): TROPONIN, TROPI, TROPR in the last 168 hours.    Invalid input(s): TROP, TROPONINIES  Recent Labs   Lab 01/15/19  1502   COLOR Lady   APPEARANCE Cloudy   URINEGLC Negative   URINEBILI Negative   URINEKETONE Negative   SG 1.018   UBLD Moderate*   URINEPH 5.0   PROTEIN 100*   NITRITE Negative   LEUKEST Large*   RBCU 34*   WBCU >182*   UA not a good sample with 32 squamous epithelial cells

## 2019-01-16 NOTE — PROVIDER NOTIFICATION
"MD paged: \"Blood cx from left arm drawn  1/15-postive for gram negative rods, now positive for Ecoli. Thank you.\"    Addendum: Order  Received to start IV Rocephin-Q24hrs.  "

## 2019-01-16 NOTE — CONSULTS
Consult Date:  01/16/2019      INFECTIOUS DISEASE CONSULTATION      ROOM:  308      REFERRING PHYSICIAN:  Dr. Baez      IMPRESSION:     1.  A 53-year-old female with acute sepsis, several days of symptoms including nausea and vomiting prominent along with diarrhea, now found to have E. coli bacteremia, almost certainly some abdominal infection, liver abscess versus choledocholithiasis versus diverticulitis.   2.  Gallbladder surgery 2 years ago.  Possible duct stones at the time, no issues since that time.      RECOMMENDATIONS:   1.  Go to [facility] for abdominal coverage.   2.  Imaging when able, creatinine still elevated, so holding off on a contrast study; could maybe do an ultrasound to at least eliminate choledocholithiasis, could do a noncontrast scan or just wait a day or 2 before getting imaging unless her symptoms or disease progresses.      HISTORY:  This 53-year-old female is seen in consultation due to sepsis and E. coli bacteremia.  The patient was in her usual state of health until late last week, when she developed nausea and vomiting, malaise and feverish symptoms.  They persisted into the weekend with a lot of nausea and some degree of diarrhea evolving.  She finally sought medical attention at this point when it was improving and a blood culture from admission is growing E. coli.  She has generally been healthy, although 2 years ago had gallbladder surgery in [place]; it is unclear whether there were any ductal stones left from that process, but she has had no issues until the present time.  She has had no other exposures.  No one else she has been around that has been ill.      PAST MEDICAL HISTORY:  Generally unremarkable otherwise.  No major infection problems in the past.      ALLERGIES:  NONE.      SOCIAL AND FAMILY HISTORY:  No recent travel or exposures.  No one else she has been around has been ill.      MEDICATIONS:  As listed.      REVIEW OF SYSTEMS:  Unremarkable otherwise, other than  the fevers, chills and things in the HPI that are mainly GI symptoms.      PHYSICAL EXAMINATION:   GENERAL:  The patient appears her stated age, does not look that ill currently.   VITAL SIGNS:  Temp is down, although still elevated currently in the 101 range, tachycardic at 100.   HEENT:  No thrush or intraoral lesions.  Pupils reactive.   NECK:  Supple and nontender.   HEART:  Regular rhythm, no major murmur.   LUNGS:  Clear bilaterally.   ABDOMEN:  Slight tenderness both lower abdomen and to a lesser extent upper abdomen.   EXTREMITIES:  No rash or skin lesions.      LABORATORY DATA:  LFTs fairly unremarkable, although alk phos mildly elevated.  No imaging to date.  Creatinine acutely elevated, previously felt to be normal; it was 2.12, but now down to 1.37.      Thank you very much for the consultation.  We will follow the patient with you.         JASEN BURKETT MD             D: 2019   T: 2019   MT: CHANG      Name:     IDALIA GAY   MRN:      [id number]        Account:       [id number]   :      1965           Consult Date:  2019      Document: [id number]

## 2019-01-16 NOTE — CONSULTS
ID consult dictated IMP 1 54 yo female acute N/V/D, fever/sepsis, E coli bactermia, almost certainly abd related infection, choledoch vs divertic vs liver abscess    REC zosyn, needs abd imaging, creat up ? Us or noncntrast first

## 2019-01-16 NOTE — PLAN OF CARE
A/Ox4, Independent in room, VSS - Tmax 100.2 (pt had several blankets on and room temp was set at 76) - temperature adjusted and some blankets removed - recheck 98.9, LS clear, RA 96-98%, denies pain, Regular diet, removed from enteric isolation - Cdiff negative, D5 1/2 NS +20KCL infusing at 125 ml/hr, plan to repeat BMP this AM, lisinopril on hold, continue with plan of care.

## 2019-01-17 ENCOUNTER — ANESTHESIA EVENT (OUTPATIENT)
Dept: SURGERY | Facility: CLINIC | Age: 54
DRG: 853 | End: 2019-01-17
Payer: COMMERCIAL

## 2019-01-17 ENCOUNTER — APPOINTMENT (OUTPATIENT)
Dept: GENERAL RADIOLOGY | Facility: CLINIC | Age: 54
DRG: 853 | End: 2019-01-17
Payer: COMMERCIAL

## 2019-01-17 ENCOUNTER — APPOINTMENT (OUTPATIENT)
Dept: CT IMAGING | Facility: CLINIC | Age: 54
DRG: 853 | End: 2019-01-17
Payer: COMMERCIAL

## 2019-01-17 ENCOUNTER — ANESTHESIA (OUTPATIENT)
Dept: SURGERY | Facility: CLINIC | Age: 54
DRG: 853 | End: 2019-01-17
Payer: COMMERCIAL

## 2019-01-17 LAB
ANION GAP SERPL CALCULATED.3IONS-SCNC: 10 MMOL/L (ref 3–14)
ANISOCYTOSIS BLD QL SMEAR: SLIGHT
BASOPHILS # BLD AUTO: 0 10E9/L (ref 0–0.2)
BASOPHILS NFR BLD AUTO: 0 %
BUN SERPL-MCNC: 14 MG/DL (ref 7–30)
BURR CELLS BLD QL SMEAR: SLIGHT
CALCIUM SERPL-MCNC: 8.4 MG/DL (ref 8.5–10.1)
CHLORIDE SERPL-SCNC: 106 MMOL/L (ref 94–109)
CO2 SERPL-SCNC: 23 MMOL/L (ref 20–32)
CREAT SERPL-MCNC: 1.48 MG/DL (ref 0.52–1.04)
DIFFERENTIAL METHOD BLD: ABNORMAL
DOHLE BOD BLD QL SMEAR: PRESENT
EOSINOPHIL # BLD AUTO: 0 10E9/L (ref 0–0.7)
EOSINOPHIL NFR BLD AUTO: 0 %
ERYTHROCYTE [DISTWIDTH] IN BLOOD BY AUTOMATED COUNT: 15.9 % (ref 10–15)
GFR SERPL CREATININE-BSD FRML MDRD: 40 ML/MIN/{1.73_M2}
GLUCOSE SERPL-MCNC: 108 MG/DL (ref 70–99)
HCT VFR BLD AUTO: 34.5 % (ref 35–47)
HGB BLD-MCNC: 10.9 G/DL (ref 11.7–15.7)
LACTATE BLD-SCNC: 1.2 MMOL/L (ref 0.7–2)
LACTATE BLD-SCNC: 2.4 MMOL/L (ref 0.7–2)
LYMPHOCYTES # BLD AUTO: 0.9 10E9/L (ref 0.8–5.3)
LYMPHOCYTES NFR BLD AUTO: 18 %
MACROCYTES BLD QL SMEAR: PRESENT
MCH RBC QN AUTO: 26.4 PG (ref 26.5–33)
MCHC RBC AUTO-ENTMCNC: 31.6 G/DL (ref 31.5–36.5)
MCV RBC AUTO: 84 FL (ref 78–100)
METAMYELOCYTES # BLD: 0 10E9/L
METAMYELOCYTES NFR BLD MANUAL: 1 %
MICROCYTES BLD QL SMEAR: PRESENT
MONOCYTES # BLD AUTO: 0.1 10E9/L (ref 0–1.3)
MONOCYTES NFR BLD AUTO: 3 %
NEUTROPHILS # BLD AUTO: 3.7 10E9/L (ref 1.6–8.3)
NEUTROPHILS NFR BLD AUTO: 78 %
PLATELET # BLD AUTO: 191 10E9/L (ref 150–450)
POLYCHROMASIA BLD QL SMEAR: SLIGHT
POTASSIUM SERPL-SCNC: 3.6 MMOL/L (ref 3.4–5.3)
RBC # BLD AUTO: 4.13 10E12/L (ref 3.8–5.2)
SODIUM SERPL-SCNC: 139 MMOL/L (ref 133–144)
WBC # BLD AUTO: 4.8 10E9/L (ref 4–11)

## 2019-01-17 PROCEDURE — 25000128 H RX IP 250 OP 636: Performed by: ANESTHESIOLOGY

## 2019-01-17 PROCEDURE — 0T778DZ DILATION OF LEFT URETER WITH INTRALUMINAL DEVICE, VIA NATURAL OR ARTIFICIAL OPENING ENDOSCOPIC: ICD-10-PCS | Performed by: UROLOGY

## 2019-01-17 PROCEDURE — 25000128 H RX IP 250 OP 636: Performed by: INTERNAL MEDICINE

## 2019-01-17 PROCEDURE — 25800025 ZZH RX 258: Performed by: INTERNAL MEDICINE

## 2019-01-17 PROCEDURE — 85025 COMPLETE CBC W/AUTO DIFF WBC: CPT | Performed by: INTERNAL MEDICINE

## 2019-01-17 PROCEDURE — 27210794 ZZH OR GENERAL SUPPLY STERILE: Performed by: UROLOGY

## 2019-01-17 PROCEDURE — 25000128 H RX IP 250 OP 636: Performed by: NURSE ANESTHETIST, CERTIFIED REGISTERED

## 2019-01-17 PROCEDURE — 52332 CYSTOSCOPY AND TREATMENT: CPT | Mod: LT | Performed by: UROLOGY

## 2019-01-17 PROCEDURE — 37000008 ZZH ANESTHESIA TECHNICAL FEE, 1ST 30 MIN: Performed by: UROLOGY

## 2019-01-17 PROCEDURE — 71000012 ZZH RECOVERY PHASE 1 LEVEL 1 FIRST HR: Performed by: UROLOGY

## 2019-01-17 PROCEDURE — 80048 BASIC METABOLIC PNL TOTAL CA: CPT | Performed by: INTERNAL MEDICINE

## 2019-01-17 PROCEDURE — 37000009 ZZH ANESTHESIA TECHNICAL FEE, EACH ADDTL 15 MIN: Performed by: UROLOGY

## 2019-01-17 PROCEDURE — 40000306 ZZH STATISTIC PRE PROC ASSESS II: Performed by: UROLOGY

## 2019-01-17 PROCEDURE — 25000125 ZZHC RX 250: Performed by: NURSE ANESTHETIST, CERTIFIED REGISTERED

## 2019-01-17 PROCEDURE — 36415 COLL VENOUS BLD VENIPUNCTURE: CPT | Performed by: INTERNAL MEDICINE

## 2019-01-17 PROCEDURE — 40000275 ZZH STATISTIC RCP TIME EA 10 MIN

## 2019-01-17 PROCEDURE — 25000125 ZZHC RX 250: Performed by: INTERNAL MEDICINE

## 2019-01-17 PROCEDURE — 36000054 ZZH SURGERY LEVEL 2 W FLUORO 1ST 30 MIN: Performed by: UROLOGY

## 2019-01-17 PROCEDURE — 25000132 ZZH RX MED GY IP 250 OP 250 PS 637: Performed by: INTERNAL MEDICINE

## 2019-01-17 PROCEDURE — 83605 ASSAY OF LACTIC ACID: CPT | Performed by: INTERNAL MEDICINE

## 2019-01-17 PROCEDURE — 36000052 ZZH SURGERY LEVEL 2 EA 15 ADDTL MIN: Performed by: UROLOGY

## 2019-01-17 PROCEDURE — 12000000 ZZH R&B MED SURG/OB

## 2019-01-17 PROCEDURE — 99291 CRITICAL CARE FIRST HOUR: CPT | Performed by: INTERNAL MEDICINE

## 2019-01-17 PROCEDURE — 71000013 ZZH RECOVERY PHASE 1 LEVEL 1 EA ADDTL HR: Performed by: UROLOGY

## 2019-01-17 PROCEDURE — 25000128 H RX IP 250 OP 636

## 2019-01-17 PROCEDURE — C2617 STENT, NON-COR, TEM W/O DEL: HCPCS | Performed by: UROLOGY

## 2019-01-17 PROCEDURE — 74176 CT ABD & PELVIS W/O CONTRAST: CPT

## 2019-01-17 PROCEDURE — 40000277 XR SURGERY CARM FLUORO LESS THAN 5 MIN W STILLS: Mod: TC

## 2019-01-17 DEVICE — STENT URETERAL POLARIS ULTRA 6FRX24CM M0061921320: Type: IMPLANTABLE DEVICE | Site: URETER | Status: FUNCTIONAL

## 2019-01-17 DEVICE — STENT URETERAL POLARIS ULTRA 6FRX26CM M0061921330
Type: IMPLANTABLE DEVICE | Site: URETER | Status: NON-FUNCTIONAL
Removed: 2019-03-05

## 2019-01-17 RX ORDER — SODIUM CHLORIDE AND POTASSIUM CHLORIDE 150; 900 MG/100ML; MG/100ML
INJECTION, SOLUTION INTRAVENOUS CONTINUOUS
Status: DISCONTINUED | OUTPATIENT
Start: 2019-01-17 | End: 2019-01-19

## 2019-01-17 RX ORDER — MEPERIDINE HYDROCHLORIDE 25 MG/ML
25 INJECTION INTRAMUSCULAR; INTRAVENOUS; SUBCUTANEOUS ONCE
Status: COMPLETED | OUTPATIENT
Start: 2019-01-17 | End: 2019-01-17

## 2019-01-17 RX ORDER — FENTANYL CITRATE 50 UG/ML
25-50 INJECTION, SOLUTION INTRAMUSCULAR; INTRAVENOUS
Status: DISCONTINUED | OUTPATIENT
Start: 2019-01-17 | End: 2019-01-17 | Stop reason: HOSPADM

## 2019-01-17 RX ORDER — ONDANSETRON 4 MG/1
4 TABLET, ORALLY DISINTEGRATING ORAL EVERY 30 MIN PRN
Status: DISCONTINUED | OUTPATIENT
Start: 2019-01-17 | End: 2019-01-17 | Stop reason: HOSPADM

## 2019-01-17 RX ORDER — CEFAZOLIN SODIUM 1 G/3ML
1 INJECTION, POWDER, FOR SOLUTION INTRAMUSCULAR; INTRAVENOUS SEE ADMIN INSTRUCTIONS
Status: DISCONTINUED | OUTPATIENT
Start: 2019-01-17 | End: 2019-01-17 | Stop reason: HOSPADM

## 2019-01-17 RX ORDER — PROPOFOL 10 MG/ML
INJECTION, EMULSION INTRAVENOUS PRN
Status: DISCONTINUED | OUTPATIENT
Start: 2019-01-17 | End: 2019-01-17

## 2019-01-17 RX ORDER — NALOXONE HYDROCHLORIDE 0.4 MG/ML
.1-.4 INJECTION, SOLUTION INTRAMUSCULAR; INTRAVENOUS; SUBCUTANEOUS
Status: DISCONTINUED | OUTPATIENT
Start: 2019-01-17 | End: 2019-01-19 | Stop reason: HOSPADM

## 2019-01-17 RX ORDER — NALOXONE HYDROCHLORIDE 0.4 MG/ML
.1-.4 INJECTION, SOLUTION INTRAMUSCULAR; INTRAVENOUS; SUBCUTANEOUS
Status: ACTIVE | OUTPATIENT
Start: 2019-01-17 | End: 2019-01-18

## 2019-01-17 RX ORDER — LIDOCAINE HYDROCHLORIDE 10 MG/ML
INJECTION, SOLUTION INFILTRATION; PERINEURAL PRN
Status: DISCONTINUED | OUTPATIENT
Start: 2019-01-17 | End: 2019-01-17

## 2019-01-17 RX ORDER — HYDROMORPHONE HYDROCHLORIDE 1 MG/ML
.3-.5 INJECTION, SOLUTION INTRAMUSCULAR; INTRAVENOUS; SUBCUTANEOUS EVERY 5 MIN PRN
Status: DISCONTINUED | OUTPATIENT
Start: 2019-01-17 | End: 2019-01-17 | Stop reason: HOSPADM

## 2019-01-17 RX ORDER — SODIUM CHLORIDE, SODIUM LACTATE, POTASSIUM CHLORIDE, CALCIUM CHLORIDE 600; 310; 30; 20 MG/100ML; MG/100ML; MG/100ML; MG/100ML
INJECTION, SOLUTION INTRAVENOUS CONTINUOUS
Status: DISCONTINUED | OUTPATIENT
Start: 2019-01-17 | End: 2019-01-17 | Stop reason: HOSPADM

## 2019-01-17 RX ORDER — FENTANYL CITRATE 50 UG/ML
INJECTION, SOLUTION INTRAMUSCULAR; INTRAVENOUS PRN
Status: DISCONTINUED | OUTPATIENT
Start: 2019-01-17 | End: 2019-01-17

## 2019-01-17 RX ORDER — ONDANSETRON 2 MG/ML
4 INJECTION INTRAMUSCULAR; INTRAVENOUS EVERY 30 MIN PRN
Status: DISCONTINUED | OUTPATIENT
Start: 2019-01-17 | End: 2019-01-17 | Stop reason: HOSPADM

## 2019-01-17 RX ORDER — CEFAZOLIN SODIUM 2 G/100ML
2 INJECTION, SOLUTION INTRAVENOUS
Status: DISCONTINUED | OUTPATIENT
Start: 2019-01-17 | End: 2019-01-17 | Stop reason: CLARIF

## 2019-01-17 RX ORDER — LIDOCAINE 40 MG/G
CREAM TOPICAL
Status: DISCONTINUED | OUTPATIENT
Start: 2019-01-17 | End: 2019-01-17 | Stop reason: HOSPADM

## 2019-01-17 RX ORDER — MEPERIDINE HYDROCHLORIDE 25 MG/ML
INJECTION INTRAMUSCULAR; INTRAVENOUS; SUBCUTANEOUS
Status: COMPLETED
Start: 2019-01-17 | End: 2019-01-17

## 2019-01-17 RX ADMIN — TAZOBACTAM SODIUM AND PIPERACILLIN SODIUM 3.38 G: 375; 3 INJECTION, SOLUTION INTRAVENOUS at 09:08

## 2019-01-17 RX ADMIN — POTASSIUM CHLORIDE AND SODIUM CHLORIDE: 900; 150 INJECTION, SOLUTION INTRAVENOUS at 18:58

## 2019-01-17 RX ADMIN — POTASSIUM CHLORIDE, DEXTROSE MONOHYDRATE AND SODIUM CHLORIDE: 150; 5; 450 INJECTION, SOLUTION INTRAVENOUS at 00:37

## 2019-01-17 RX ADMIN — FAMOTIDINE 20 MG: 10 INJECTION, SOLUTION INTRAVENOUS at 19:17

## 2019-01-17 RX ADMIN — FENTANYL CITRATE 100 MCG: 50 INJECTION, SOLUTION INTRAMUSCULAR; INTRAVENOUS at 13:22

## 2019-01-17 RX ADMIN — MIDAZOLAM 2 MG: 1 INJECTION INTRAMUSCULAR; INTRAVENOUS at 13:16

## 2019-01-17 RX ADMIN — ACETAMINOPHEN 650 MG: 325 TABLET, FILM COATED ORAL at 12:00

## 2019-01-17 RX ADMIN — TAZOBACTAM SODIUM AND PIPERACILLIN SODIUM 3.38 G: 375; 3 INJECTION, SOLUTION INTRAVENOUS at 17:07

## 2019-01-17 RX ADMIN — MEPERIDINE HYDROCHLORIDE 25 MG: 25 INJECTION INTRAMUSCULAR; INTRAVENOUS; SUBCUTANEOUS at 07:36

## 2019-01-17 RX ADMIN — FAMOTIDINE 20 MG: 10 INJECTION, SOLUTION INTRAVENOUS at 06:31

## 2019-01-17 RX ADMIN — PROPOFOL 160 MG: 10 INJECTION, EMULSION INTRAVENOUS at 13:22

## 2019-01-17 RX ADMIN — VASOPRESSIN 1 UNITS: 20 INJECTION, SOLUTION INTRAMUSCULAR; SUBCUTANEOUS at 13:45

## 2019-01-17 RX ADMIN — LIDOCAINE HYDROCHLORIDE 30 MG: 10 INJECTION, SOLUTION INFILTRATION; PERINEURAL at 13:22

## 2019-01-17 RX ADMIN — POTASSIUM CHLORIDE AND SODIUM CHLORIDE: 900; 150 INJECTION, SOLUTION INTRAVENOUS at 10:51

## 2019-01-17 RX ADMIN — VASOPRESSIN 1 UNITS: 20 INJECTION, SOLUTION INTRAMUSCULAR; SUBCUTANEOUS at 13:30

## 2019-01-17 RX ADMIN — ACETAMINOPHEN 650 MG: 325 TABLET, FILM COATED ORAL at 00:36

## 2019-01-17 RX ADMIN — TAZOBACTAM SODIUM AND PIPERACILLIN SODIUM 3.38 G: 375; 3 INJECTION, SOLUTION INTRAVENOUS at 02:23

## 2019-01-17 RX ADMIN — ONDANSETRON 4 MG: 2 INJECTION INTRAMUSCULAR; INTRAVENOUS at 13:38

## 2019-01-17 RX ADMIN — SODIUM CHLORIDE, POTASSIUM CHLORIDE, SODIUM LACTATE AND CALCIUM CHLORIDE: 600; 310; 30; 20 INJECTION, SOLUTION INTRAVENOUS at 12:21

## 2019-01-17 RX ADMIN — SODIUM CHLORIDE 500 ML: 9 INJECTION, SOLUTION INTRAVENOUS at 12:00

## 2019-01-17 RX ADMIN — PHENYLEPHRINE HYDROCHLORIDE 200 MCG: 10 INJECTION, SOLUTION INTRAMUSCULAR; INTRAVENOUS; SUBCUTANEOUS at 13:24

## 2019-01-17 ASSESSMENT — ACTIVITIES OF DAILY LIVING (ADL)
ADLS_ACUITY_SCORE: 12

## 2019-01-17 ASSESSMENT — ENCOUNTER SYMPTOMS: DYSRHYTHMIAS: 0

## 2019-01-17 NOTE — ANESTHESIA PREPROCEDURE EVALUATION
Anesthesia Pre-Procedure Evaluation    Patient: Bre Caballero   MRN: [id number] : 1965          Preoperative Diagnosis: sepsis    Procedure(s):  COMBINED CYSTOSCOPY, INSERT STENT URETER(S)    Past Medical History:   Diagnosis Date     Hypertension      Past Surgical History:   Procedure Laterality Date     CHOLECYSTECTOMY       Anesthesia Evaluation     .             ROS/MED HX    ENT/Pulmonary:      (-) asthma, sleep apnea and Other pulmonary disease   Neurologic:      (-) TIA and Dementia   Cardiovascular:     (+) Dyslipidemia, hypertension----. : . . . :. .      (-) CAD, CHF, arrhythmias and pulmonary hypertension   METS/Exercise Tolerance:     Hematologic:        (-) anemia   Musculoskeletal:        (-) arthritis   GI/Hepatic:        (-) GERD and hepatitis   Renal/Genitourinary:     (+) chronic renal disease (Urosepsis),       Endo:     (+) Obesity, .   (-) Type I DM, Type II DM, thyroid disease, chronic steroid usage and other endocrine disorder   Psychiatric:        (-) psychiatric history   Infectious Disease:   (+) Recent Fever, Other Infectious Disease Urosepsis      Malignancy:      - no malignancy   Other:    - neg other ROS                      Physical Exam      Airway   Mallampati: II  TM distance: >3 FB  Neck ROM: full    Dental     Cardiovascular   Rhythm and rate: regular and normal  (-) no murmur    Pulmonary    breath sounds clear to auscultation    Other findings: Lab Test        01/17/19     01/16/19     01/15/19     01/15/19                       0813          0816          1239          1234          WBC          4.8          14.5*         --          13.9*         HGB          10.9*        11.5*        13.9         12.5          MCV          84           84            --          83            PLT          191          270           --          279            Lab Test        01/17/19     01/16/19     01/15/19     01/15/19                       0813          0816          1239          " 1234          NA           139          137          135          132*          POTASSIUM    3.6          4.2          3.1*         3.3*          CHLORIDE     106          104          99           101           CO2          23           27            --          22            BUN          14           21           35*          37*           CR           1.48*        1.37*         --          2.12*         ANIONGAP     10           6            14           9             CHERI          8.4*         8.8           --          9.2           GLC          108*         146*         166*         151*                Lab Results   Component Value Date    WBC 4.8 01/17/2019    HGB 10.9 (L) 01/17/2019    HCT 34.5 (L) 01/17/2019     01/17/2019     01/17/2019    POTASSIUM 3.6 01/17/2019    CHLORIDE 106 01/17/2019    CO2 23 01/17/2019    BUN 14 01/17/2019    CR 1.48 (H) 01/17/2019     (H) 01/17/2019    CHERI 8.4 (L) 01/17/2019    ALBUMIN 2.9 (L) 01/15/2019    PROTTOTAL 7.7 01/15/2019    ALT 30 01/15/2019    AST 31 01/15/2019    ALKPHOS 189 (H) 01/15/2019    BILITOTAL 0.6 01/15/2019    HCGS Negative 01/15/2019       Preop Vitals  BP Readings from Last 3 Encounters:   01/17/19 (!) 147/94    Pulse Readings from Last 3 Encounters:   01/17/19 131      Resp Readings from Last 3 Encounters:   01/17/19 20    SpO2 Readings from Last 3 Encounters:   01/17/19 96%      Temp Readings from Last 1 Encounters:   01/17/19 (!) 105.1  F (40.6  C) (Temporal)    Ht Readings from Last 1 Encounters:   01/15/19 1.676 m (5' 6\")      Wt Readings from Last 1 Encounters:   01/15/19 93.8 kg (206 lb 11.2 oz)    Estimated body mass index is 33.36 kg/m  as calculated from the following:    Height as of this encounter: 1.676 m (5' 6\").    Weight as of this encounter: 93.8 kg (206 lb 11.2 oz).       Anesthesia Plan      History & Physical Review  History and physical reviewed and following examination; no interval change.    ASA Status:  3 .  "   NPO Status:  > 8 hours    Plan for General and LMA with Propofol induction. Maintenance will be Balanced.    PONV prophylaxis:  Ondansetron (or other 5HT-3)       Postoperative Care  Postoperative pain management:  IV analgesics and Oral pain medications.      Consents  Anesthetic plan, risks, benefits and alternatives discussed with:  Patient..                 Bear Johnson MD                    .

## 2019-01-17 NOTE — PLAN OF CARE
VSS temp 104.8. PRN given. Ice applied. Recheck 101.9 Recheck temp 97.4  Denies any pain. Fluids running.Will continue to monitor.

## 2019-01-17 NOTE — PLAN OF CARE
RRT called this AM for chills, shaking. Admitted on 1/15 with N/V/D. Intermittent elevated temps-max temp 106.1 temporal-treated with Tylenol and cold packs, MD present at bedside. Continues on IV Zosyn. Abd/pelvis CT ordered and performed-see results review. Urology consult ordered-surgery ordered for ureter stent placement. Pt transferred to OR approx 13:00. Spouse at bedside. ID following.

## 2019-01-17 NOTE — ANESTHESIA CARE TRANSFER NOTE
Patient: Bre GUTIERREZ Federico    Procedure(s):  COMBINED CYSTOSCOPY, INSERT STENT URETER(S)    Diagnosis: sepsis  Diagnosis Additional Information: No value filed.    Anesthesia Type:   General, LMA     Note:  Airway :Face Mask  Patient transferred to:PACU  Comments: Pt spon resp, follows commands, LMA removed without complications. Pt tx to PACU, VSS, pt comfortable. Report given to  PACU RN.Handoff Report: Identifed the Patient, Identified the Reponsible Provider, Reviewed the pertinent medical history, Discussed the surgical course, Reviewed Intra-OP anesthesia mangement and issues during anesthesia, Set expectations for post-procedure period and Allowed opportunity for questions and acknowledgement of understanding      Vitals: (Last set prior to Anesthesia Care Transfer)    CRNA VITALS  1/17/2019 1330 - 1/17/2019 1406      1/17/2019             Pulse:  111    SpO2:  96 %    Resp Rate (observed):  7  (Abnormal)                 Electronically Signed By: ABHIJEET Ledesma CRNA  January 17, 2019  2:06 PM

## 2019-01-17 NOTE — BRIEF OP NOTE
Diagnosis: Left proximal ureteral calculus, urosepsis  Procedure: Video cystoscopy, left double-J stent placement (6 [place] by 26 cm)  Surgeon: Rohith  Anesthesia: Gen. Laryngeal mask  Estimated blood loss: 0 ml  Findings: Left  pyonephrosis

## 2019-01-17 NOTE — PROGRESS NOTES
Still with fever/chills  CTof abdomen/pelvis showed L 1 cm UPJ stone with hydronephrosis.  3.2 cm liver lesion medial segment. Likely focal fatty infiltration.  Paged out Urology consultation.    Sasha

## 2019-01-17 NOTE — PROGRESS NOTES
[facility]  Hospitalist Progress Note  Owen Baez MD, MD 01/17/2019  (Text Page)  Reason for Stay (Diagnosis): sepsis with solated E coli suspect UTI related?         Assessment and Plan:      Summary of Stay: Bre Caballero is a 53 year old female known prior history of hypertension and was in her usual state of health until several days prior to this hospitalization when she started having nausea, vomiting, diarrhea and was admitted on 1/15/2019 with dehydration and renal failure.    Problem List:   1. Sepsis with presenting leukocytosis, fever, tachycardia, lactic acidosis now with isolated E. coli bacteremia  2. ?  UTI?  Found with pyuria and bacteriuria but denies any urinary symptomatology prior to this hospitalization.  3. Acute kidney injury secondary to #1, prerenal with dehydration, suspected acute tubular necrosis also given sepsis.  4. Hypertension    Continue inpatient care as she remains high risk for clinical deterioration.  Now on IV Zosyn.  Highly appreciate input from ID service.  Creatinine still on the high side at 1.48.  Will pursue imaging with CT of the abdomen with a noncontrast study.  Patient has prior cholecystectomy, no flank pain, no urinary symptomatology.   Continue to monitor metabolic panel regarding her creatinine levels.  Hold her home regimen of lisinopril.  Out of bed to chair activities if feasible.  APAP as needed.  Change IV fluids to isotonic solution.    DVT Prophylaxis: Pneumatic Compression Devices  Code Status: Full Code  Discharge Dispo: Home  Estimated Disch Date / # of Days until Disch: At least 2 more days        Interval History (Subjective):      Continuing care today.  Seen and examined.  Chart reviewed.  Case discussed with nursing service.    Patient still had intermittent fever with rigors and chills.  Mental state remained at baseline.  She continues to deny any nausea, vomiting, abdominal pain, discomfort, chest pain, shortness of  "breath, flank pain, bleeding tendencies and had some one episode of soft pasty stools.    Appetite still low but trying to tolerate as much as she can with her oral diet.  RRT called earlier this morning due to frequent Reiger's and chills.  Hemodynamics remained stable.  Not requiring any oxygen supplementation.     # Pain Assessment:  Current Pain Score 1/17/2019   Patient currently in pain? denies   Pain score (0-10) 0   Bre mcdaniel pain level was assessed and she currently denies pain.                  Physical Exam:      Last Vital Signs:  /80   Pulse 107   Temp 101.2  F (38.4  C) (Axillary)   Resp 20   Ht 1.676 m (5' 6\")   Wt 93.8 kg (206 lb 11.2 oz)   SpO2 100%   BMI 33.36 kg/m      I/O last 3 completed shifts:  In: 240 [P.O.:240]  Out: 450 [Urine:450]  Wt Readings from Last 1 Encounters:   01/15/19 93.8 kg (206 lb 11.2 oz)     Vitals:    01/15/19 1206 01/15/19 1846   Weight: 88.5 kg (195 lb) 93.8 kg (206 lb 11.2 oz)       Constitutional: Awake, alert, cooperative, no apparent distress   Respiratory: Clear to auscultation bilaterally, no crackles or wheezing   Cardiovascular: Regular rate and rhythm, normal S1 and S2, and no murmur noted   Abdomen: Normal bowel sounds, soft, non-distended, non-tender   Skin: No rashes, no cyanosis, dry to touch   Neuro: Alert and oriented x3, no weakness, spontaneous and coherent speech   Extremities: No edema, normal range of motion   Other(s): Euthymic mood, not agitated       All other systems: Negative          Medications:      All current medications were reviewed with changes reflected in problem list.         Data:      All new lab and imaging data was reviewed.   Labs:  Recent Labs   Lab 01/17/19  0813 01/16/19  0816 01/15/19  1239 01/15/19  1234   WBC 4.8 14.5*  --  13.9*   HGB 10.9* 11.5* 13.9 12.5   HCT 34.5* 35.4  --  38.4   MCV 84 84  --  83    270  --  279     Recent Labs   Lab 01/17/19  0813 01/16/19  0816 01/15/19  1239 01/15/19  1234    " 137 135 132*   POTASSIUM 3.6 4.2 3.1* 3.3*   CHLORIDE 106 104 99 101   CO2 23 27  --  22   ANIONGAP 10 6 14 9   * 146* 166* 151*   BUN 14 21 35* 37*   CR 1.48* 1.37*  --  2.12*   GFRESTIMATED 40* 44* 25* 26*   GFRESTBLACK 46* 51* 30* 30*   CHERI 8.4* 8.8  --  9.2   PROTTOTAL  --   --   --  7.7   ALBUMIN  --   --   --  2.9*   BILITOTAL  --   --   --  0.6   ALKPHOS  --   --   --  189*   AST  --   --   --  31   ALT  --   --   --  30     Recent Labs   Lab 01/17/19  0813 01/16/19  0816 01/15/19  1239 01/15/19  1234   * 146* 166* 151*     No results for input(s): INR in the last 168 hours.  No results for input(s): TROPONIN, TROPI, TROPR in the last 168 hours.    Invalid input(s): TROP, TROPONINIES  Recent Labs   Lab 01/15/19  1545   COLOR Yellow   APPEARANCE Slightly Cloudy   URINEGLC Negative   URINEBILI Negative   URINEKETONE Negative   SG 1.014   UBLD Large*   URINEPH 5.0   PROTEIN 30*   NITRITE Negative   LEUKEST Negative   RBCU <1   WBCU 10*      Imaging:   No results found for this or any previous visit.  Pending Ct abdomen/pelvis

## 2019-01-17 NOTE — PROGRESS NOTES
Pt RR 30 with rigors. Temp 99.Other VSS. RRT called. IV Demerol given per Doc. Pt rigors calm and breathing improves.

## 2019-01-17 NOTE — PROGRESS NOTES
Cross coverage: RRT called due to uncontrollable rigors.  Chart reviewed.  Patient admitted with E. coli sepsis. She did have significant pyuria on admission but cannot rule out a GI source as she has no urinary symptoms but did have some diarrhea.  Already empirically on Zosyn.  Will give a dose of IV Demerol.  Otherwise, hemodynamically stable.  On examination, patient is alert but shaking.  Current temperature 99.9 and blood pressure was 129/58 but currently difficult to obtain as patient is shaking.  However, patient is alert and oriented x3 and able to answer questions appropriately.    Critical care time >30 minutes

## 2019-01-17 NOTE — ANESTHESIA POSTPROCEDURE EVALUATION
Patient: Bre GUTIERREZ Federico    Procedure(s):  COMBINED CYSTOSCOPY, INSERT STENT URETER(S)    Diagnosis:sepsis  Diagnosis Additional Information: Terence Castillo MD  Physician  Urology  Brief Op Note  Signed  Date of Service:  1/17/2019  2:04 PM  Creation Time:  1/17/2019  2:00 PM                   Hide copied text    Hover for details      Diagnosis: Left proximal ureteral calculus, urosep, sis  Procedure: Video cystoscopy, left double-J stent placement (6 Indonesian by 26 cm)              Anesthesia Type:  General, LMA    Note:  Anesthesia Post Evaluation    Patient location during evaluation: PACU  Patient participation: Able to fully participate in evaluation  Level of consciousness: awake  Pain management: adequate  Airway patency: patent  Cardiovascular status: acceptable  Respiratory status: acceptable  Hydration status: euvolemic  PONV: controlled     Anesthetic complications: None          Last vitals:  Vitals:    01/17/19 1440 01/17/19 1445 01/17/19 1500   BP: 103/66 100/62    Pulse: 101 100    Resp: 22 19    Temp:      SpO2: 100% 100% 100%         Electronically Signed By: Bear Johnson MD  January 17, 2019  3:24 PM

## 2019-01-17 NOTE — PROGRESS NOTES
[facility]    Sepsis Evaluation Progress Note    Date of Service: 01/17/2019    I was called to see Bre Caballero due to abnormal vital signs triggering the Sepsis SIRS screening alert. She is known to have an infection.     Physical Exam    Vital Signs:  Temp: 101.2  F (38.4  C) Temp src: Axillary BP: 126/80 Pulse: 107 Heart Rate: 120 Resp: 20 SpO2: 100 % O2 Device: None (Room air) Oxygen Delivery: 2 LPM    Lab:  Lactic Acid   Date Value Ref Range Status   01/15/2019 2.4 (H) 0.7 - 2.1 mmol/L Final     Lactate for Sepsis Protocol   Date Value Ref Range Status   01/17/2019 2.4 (H) 0.7 - 2.0 mmol/L Final     Comment:     Significant value called to and read back by  BROOKE ASHTON RN (L3) ON 01.17.19 AT 10:30 BY ARIELA         The patient is at baseline mental status.    The rest of their physical exam is significant for see my progress note.    Assessment and Plan    The SIRS and exam findings are likely due to   sepsis.     ID: The patient is currently on the following antibiotics:  Anti-infectives (From now, onward)    Start     Dose/Rate Route Frequency Ordered Stop    01/16/19 1415  piperacillin-tazobactam (ZOSYN) infusion 3.375 g     Comments:  Pharmacy can adjust dose based on renal function.    3.375 g  100 mL/hr over 30 Minutes Intravenous EVERY 6 HOURS 01/16/19 1405          Current antibiotic coverage is appropriate for source of infection.    Fluid: Fluid bolus ordered.    Lab: Repeat lactic acid ordered for 2 hours from now.    Disposition: The patient will remain on the current unit. We will continue to monitor this patient closely.    Owen Baez MD, MD

## 2019-01-18 LAB
ANION GAP SERPL CALCULATED.3IONS-SCNC: 7 MMOL/L (ref 3–14)
BASOPHILS # BLD AUTO: 0.1 10E9/L (ref 0–0.2)
BASOPHILS NFR BLD AUTO: 0.4 %
BUN SERPL-MCNC: 17 MG/DL (ref 7–30)
CALCIUM SERPL-MCNC: 8.1 MG/DL (ref 8.5–10.1)
CHLORIDE SERPL-SCNC: 107 MMOL/L (ref 94–109)
CO2 SERPL-SCNC: 24 MMOL/L (ref 20–32)
CREAT SERPL-MCNC: 1.37 MG/DL (ref 0.52–1.04)
DIFFERENTIAL METHOD BLD: ABNORMAL
EOSINOPHIL # BLD AUTO: 0.1 10E9/L (ref 0–0.7)
EOSINOPHIL NFR BLD AUTO: 0.8 %
ERYTHROCYTE [DISTWIDTH] IN BLOOD BY AUTOMATED COUNT: 16.3 % (ref 10–15)
GFR SERPL CREATININE-BSD FRML MDRD: 44 ML/MIN/{1.73_M2}
GLUCOSE SERPL-MCNC: 93 MG/DL (ref 70–99)
HCT VFR BLD AUTO: 30.7 % (ref 35–47)
HGB BLD-MCNC: 10 G/DL (ref 11.7–15.7)
IMM GRANULOCYTES # BLD: 0.4 10E9/L (ref 0–0.4)
IMM GRANULOCYTES NFR BLD: 2.6 %
LACTATE BLD-SCNC: 1.1 MMOL/L (ref 0.7–2)
LYMPHOCYTES # BLD AUTO: 1.2 10E9/L (ref 0.8–5.3)
LYMPHOCYTES NFR BLD AUTO: 8.7 %
MCH RBC QN AUTO: 27 PG (ref 26.5–33)
MCHC RBC AUTO-ENTMCNC: 32.6 G/DL (ref 31.5–36.5)
MCV RBC AUTO: 83 FL (ref 78–100)
MONOCYTES # BLD AUTO: 1.1 10E9/L (ref 0–1.3)
MONOCYTES NFR BLD AUTO: 8 %
NEUTROPHILS # BLD AUTO: 11.1 10E9/L (ref 1.6–8.3)
NEUTROPHILS NFR BLD AUTO: 79.5 %
NRBC # BLD AUTO: 0 10*3/UL
NRBC BLD AUTO-RTO: 0 /100
PLATELET # BLD AUTO: 193 10E9/L (ref 150–450)
POTASSIUM SERPL-SCNC: 3.8 MMOL/L (ref 3.4–5.3)
RBC # BLD AUTO: 3.7 10E12/L (ref 3.8–5.2)
SODIUM SERPL-SCNC: 138 MMOL/L (ref 133–144)
WBC # BLD AUTO: 14 10E9/L (ref 4–11)

## 2019-01-18 PROCEDURE — 99233 SBSQ HOSP IP/OBS HIGH 50: CPT | Performed by: INTERNAL MEDICINE

## 2019-01-18 PROCEDURE — 36415 COLL VENOUS BLD VENIPUNCTURE: CPT | Performed by: HOSPITALIST

## 2019-01-18 PROCEDURE — 25000125 ZZHC RX 250: Performed by: INTERNAL MEDICINE

## 2019-01-18 PROCEDURE — 85025 COMPLETE CBC W/AUTO DIFF WBC: CPT | Performed by: UROLOGY

## 2019-01-18 PROCEDURE — 83605 ASSAY OF LACTIC ACID: CPT | Performed by: HOSPITALIST

## 2019-01-18 PROCEDURE — 25000128 H RX IP 250 OP 636: Performed by: INTERNAL MEDICINE

## 2019-01-18 PROCEDURE — 25000132 ZZH RX MED GY IP 250 OP 250 PS 637: Performed by: INTERNAL MEDICINE

## 2019-01-18 PROCEDURE — 80048 BASIC METABOLIC PNL TOTAL CA: CPT | Performed by: UROLOGY

## 2019-01-18 PROCEDURE — 12000000 ZZH R&B MED SURG/OB

## 2019-01-18 PROCEDURE — 36415 COLL VENOUS BLD VENIPUNCTURE: CPT | Performed by: UROLOGY

## 2019-01-18 RX ADMIN — POTASSIUM CHLORIDE AND SODIUM CHLORIDE: 900; 150 INJECTION, SOLUTION INTRAVENOUS at 03:36

## 2019-01-18 RX ADMIN — TAZOBACTAM SODIUM AND PIPERACILLIN SODIUM 3.38 G: 375; 3 INJECTION, SOLUTION INTRAVENOUS at 00:02

## 2019-01-18 RX ADMIN — TAZOBACTAM SODIUM AND PIPERACILLIN SODIUM 3.38 G: 375; 3 INJECTION, SOLUTION INTRAVENOUS at 22:20

## 2019-01-18 RX ADMIN — TAZOBACTAM SODIUM AND PIPERACILLIN SODIUM 3.38 G: 375; 3 INJECTION, SOLUTION INTRAVENOUS at 16:07

## 2019-01-18 RX ADMIN — TAZOBACTAM SODIUM AND PIPERACILLIN SODIUM 3.38 G: 375; 3 INJECTION, SOLUTION INTRAVENOUS at 11:00

## 2019-01-18 RX ADMIN — FAMOTIDINE 20 MG: 10 INJECTION, SOLUTION INTRAVENOUS at 05:43

## 2019-01-18 RX ADMIN — ACETAMINOPHEN 650 MG: 325 TABLET, FILM COATED ORAL at 14:37

## 2019-01-18 RX ADMIN — TAZOBACTAM SODIUM AND PIPERACILLIN SODIUM 3.38 G: 375; 3 INJECTION, SOLUTION INTRAVENOUS at 05:43

## 2019-01-18 RX ADMIN — POTASSIUM CHLORIDE AND SODIUM CHLORIDE: 900; 150 INJECTION, SOLUTION INTRAVENOUS at 16:07

## 2019-01-18 ASSESSMENT — ACTIVITIES OF DAILY LIVING (ADL)
ADLS_ACUITY_SCORE: 12

## 2019-01-18 NOTE — PLAN OF CARE
VS stable. Afebrile until 1430- 100.7 was the patient's temp. Paged to let Dr. Baez know. Tyelnol given. Capnography discontinued at 1430 as been stable & . A & O x 4.   Diet:regular  Ambulates: SBA   IV meds: 0.9 NS with 5% dextrose and 20 meq of K+ reduced to 75 ml/hr  Pain:denies   Abnormal labs: WBC 14,  creatine 1.37   Abnormal assessments: UTV incisions from surgery yesterday, no obvious drainage noted.   Intake/output: patient states diarrhea this morning, esparza catheter was removed at 1130. Has voided since   Discharge plan: likely 2 more days per MD note.. Will continue to monitor and review plan of care.

## 2019-01-18 NOTE — PROGRESS NOTES
Returned from surgery at 1615. Awake alert and oriented. Conn patent draining pale yellow/whitish colored urine with white flecks. VSS. Capnography applied (see flowsheet).  at bedside. IVF infusing taking ice chips and then tolerated 50% of regular diet. PCD's on bilaterally. Receiving IV antibiotics.

## 2019-01-18 NOTE — PROGRESS NOTES
Urology Note    Urosepsis secondary to left proximal ureteral stone. Stent placed yesterday.  Clinically doing well - no fevers overnight.  Urine clear    POD 1 s/p left ureteral stent placement in setting of stone and urosepsis.  OK for Conn removal today  Continue empiric IV antibiotics - cultures pending  Will need at least 2 weeks of abx and will subsequently require outpatient management of her stone.  Urology will follow    Raghavendra Gunn MD  Urology  [facility]  Clinic Phone [phone]

## 2019-01-18 NOTE — PROGRESS NOTES
"[facility]  Infectious Disease Progress Note          Assessment and Plan:   IMPRESSION:     1.  A 53-year-old female with acute sepsis, several days of symptoms including nausea and vomiting prominent along with diarrhea, now found to have E. coli bacteremia, no urinary sxs or typical stone sxs but dx now obvious with obstruction L kidney  2.  Gallbladder surgery 2 years ago.  Possible duct stones at the time, no issues since that time.      RECOMMENDATIONS:   1.  Continue Zosyn , await sens, lab slow as ? 2 strains.   2.  Imaging has defineddx , urology stent in, for some reason no UC but dx not in doubt, as such fever even with stent in not surprising with pyelo, urology Follow-up plan on stone/stent  3 If quinolone or sulfa sens po soon and home, if not look at alternatives            Interval History:   no new complaints ,and better post stent CT defines this no UC on initial abnormal UA  sens pending              Medications:       piperacillin-tazobactam  3.375 g Intravenous Q6H     sodium chloride (PF)  3 mL Intracatheter Q8H                  Physical Exam:   Blood pressure 138/86, pulse 100, temperature 98.2  F (36.8  C), resp. rate 20, height 1.676 m (5' 6\"), weight 93.8 kg (206 lb 11.2 oz), SpO2 94 %.  Wt Readings from Last 2 Encounters:   01/15/19 93.8 kg (206 lb 11.2 oz)     Vital Signs with Ranges  Temp:  [96.3  F (35.7  C)-99.4  F (37.4  C)] 98.2  F (36.8  C)  Pulse:  [100-108] 100  Heart Rate:  [] 108  Resp:  [9-28] 20  BP: ()/(49-86) 138/86  SpO2:  [91 %-100 %] 94 %    Constitutional: Looks better    Lungs: Clear to auscultation bilaterally, no crackles or wheezing   Cardiovascular: Regular rate and rhythm, normal S1 and S2, and no murmur noted   Abdomen: Normal bowel sounds, soft, non-distended, non-tender   Skin: No rashes, no cyanosis, no edema   Other:           Data:   All microbiology laboratory data reviewed.  Recent Labs   Lab Test 01/18/19  0636 " 01/17/19  0813 01/16/19  0816   WBC 14.0* 4.8 14.5*   HGB 10.0* 10.9* 11.5*   HCT 30.7* 34.5* 35.4   MCV 83 84 84    191 270     Recent Labs   Lab Test 01/18/19  0636 01/17/19  0813 01/16/19  0816   CR 1.37* 1.48* 1.37*     No lab results found.  Recent Labs   Lab Test 01/15/19  1505 01/15/19  1232   CULT No growth after 2 days Cultured on the 1st day of incubation:  Escherichia coli  *  Critical Value/Significant Value, preliminary result only, called to and read back by  Tahmina Robertson,  RN [id number], RN 0503 1.16.19 [facility]    Culture in progress  (Note)  POSITIVE for E.COLI by Verigene multiplex nucleic acid test. Final  identification and antimicrobial susceptibility testing will be  verified by standard methods. Verigene test will not distinguish  E.coli from Shigella species including S.dysenteriae, S.flexneri,  S.boydii, and S.sonnei. Specimens containing Shigella species or  E.coli will be reported as Positive for E.coli.      Specimen tested with Verigene multiplex, gram-negative blood culture  nucleic acid test for the following targets: Acinetobacter sp.,  Citrobacter sp., Enterobacter sp., Proteus sp., E. coli, K.  pneumoniae/oxytoca, P. aeruginosa, and the following resistance  markers: CTXM, KPC, NDM, VIM, IMP and OXA.      Critical Value/Significant Value called to and read back by Kristina Dixon RN on 1.16.2019 at 0729 by .

## 2019-01-18 NOTE — PROGRESS NOTES
"[facility]  Hospitalist Progress Note  Owen Baez MD, MD 01/18/2019  (Text Page)  Reason for Stay (Diagnosis): sepsis with solated E coli suspect UTI related?         Assessment and Plan:      Summary of Stay: Bre Cabalelro is a 53 year old female known prior history of hypertension and was in her usual state of health until several days prior to this hospitalization when she started having nausea, vomiting, diarrhea and was admitted on 1/15/2019 with dehydration and renal failure.    Problem List:   1. Sepsis with presenting leukocytosis, fever, tachycardia, lactic acidosis now with isolated E. coli bacteremia  2. Obstructing 1 cm UPJ stone with hydronephrosis, status post left proximal ureteral calculus with findings of left pyonephrosis  3. Acute kidney injury secondary to #1, prerenal with dehydration, suspected acute tubular necrosis also given sepsis.  4. Hypertension    Continue inpatient care as she remains high risk for clinical deterioration.  Continue IV Zosyn.  Pending blood cultures results and sensitivities.  Creatinine improving at 1.3.   Decrease IV fluids as patient has better appetite and tolerating oral diet.    DVT Prophylaxis: Pneumatic Compression Devices  Code Status: Full Code  Discharge Dispo: Home  Estimated Disch Date / # of Days until Disch: At least 2 more days        Interval History (Subjective):      Continuing care today.  Seen and examined.  Chart reviewed.    Patient feeling a whole lot better.  No longer having chills or rigors.  Afebrile this morning.  No mental status changes.  Not complaining of any chest pain, shortness of breath or abdominal pain.  No nausea or vomiting.  She is in good spirits as she has significant improvement.              Physical Exam:      Last Vital Signs:  /84 (BP Location: Right arm)   Pulse 100   Temp 99.4  F (37.4  C) (Oral)   Resp 21   Ht 1.676 m (5' 6\")   Wt 93.8 kg (206 lb 11.2 oz)   SpO2 94%   BMI 33.36 " kg/m      I/O last 3 completed shifts:  In: 859 [P.O.:350; I.V.:509]  Out: 1500 [Urine:1500]  Wt Readings from Last 1 Encounters:   01/15/19 93.8 kg (206 lb 11.2 oz)     Vitals:    01/15/19 1206 01/15/19 1846   Weight: 88.5 kg (195 lb) 93.8 kg (206 lb 11.2 oz)       Constitutional: Awake, alert, cooperative, no apparent distress   Respiratory: Clear to auscultation bilaterally, no crackles or wheezing   Cardiovascular: Regular rate and rhythm, normal S1 and S2, and no murmur noted   Abdomen: Normal bowel sounds, soft, non-distended, non-tender   Skin: No rashes, no cyanosis, dry to touch   Neuro: Alert and oriented x3, no weakness, spontaneous and coherent speech   Extremities: No edema, normal range of motion   Other(s): Euthymic mood, not agitated       All other systems: Negative          Medications:      All current medications were reviewed with changes reflected in problem list.         Data:      All new lab and imaging data was reviewed.   Labs:  Recent Labs   Lab 01/18/19  0636 01/17/19  0813 01/16/19  0816   WBC 14.0* 4.8 14.5*   HGB 10.0* 10.9* 11.5*   HCT 30.7* 34.5* 35.4   MCV 83 84 84    191 270     Recent Labs   Lab 01/18/19  0636 01/17/19  0813 01/16/19  0816  01/15/19  1234    139 137   < > 132*   POTASSIUM 3.8 3.6 4.2   < > 3.3*   CHLORIDE 107 106 104   < > 101   CO2 24 23 27  --  22   ANIONGAP 7 10 6   < > 9   GLC 93 108* 146*   < > 151*   BUN 17 14 21   < > 37*   CR 1.37* 1.48* 1.37*  --  2.12*   GFRESTIMATED 44* 40* 44*   < > 26*   GFRESTBLACK 51* 46* 51*   < > 30*   CHERI 8.1* 8.4* 8.8  --  9.2   PROTTOTAL  --   --   --   --  7.7   ALBUMIN  --   --   --   --  2.9*   BILITOTAL  --   --   --   --  0.6   ALKPHOS  --   --   --   --  189*   AST  --   --   --   --  31   ALT  --   --   --   --  30    < > = values in this interval not displayed.     Recent Labs   Lab 01/18/19  0636 01/17/19  0813 01/16/19  0816 01/15/19  1239 01/15/19  1234   GLC 93 108* 146* 166* 151*     No results for  input(s): INR in the last 168 hours.  No results for input(s): TROPONIN, TROPI, TROPR in the last 168 hours.    Invalid input(s): TROP, TROPONINIES  Recent Labs   Lab 01/15/19  3705   COLOR Yellow   APPEARANCE Slightly Cloudy   URINEGLC Negative   URINEBILI Negative   URINEKETONE Negative   SG 1.014   UBLD Large*   URINEPH 5.0   PROTEIN 30*   NITRITE Negative   LEUKEST Negative   RBCU <1   WBCU 10*      Imaging:   No results found for this or any previous visit  CT abdomen and pelvis showed previously described 1 cm UPJ stone with accompanying hydronephrosis

## 2019-01-19 VITALS
DIASTOLIC BLOOD PRESSURE: 90 MMHG | OXYGEN SATURATION: 96 % | RESPIRATION RATE: 18 BRPM | WEIGHT: 206.7 LBS | BODY MASS INDEX: 33.22 KG/M2 | SYSTOLIC BLOOD PRESSURE: 156 MMHG | HEART RATE: 100 BPM | TEMPERATURE: 100.4 F | HEIGHT: 66 IN

## 2019-01-19 PROBLEM — D72.829 LEUKOCYTOSIS: Status: ACTIVE | Noted: 2019-01-19

## 2019-01-19 PROBLEM — A41.51 E. COLI SEPSIS (H): Status: ACTIVE | Noted: 2019-01-19

## 2019-01-19 PROBLEM — R50.9 FEVER: Status: ACTIVE | Noted: 2019-01-19

## 2019-01-19 LAB
ANION GAP SERPL CALCULATED.3IONS-SCNC: 5 MMOL/L (ref 3–14)
BUN SERPL-MCNC: 15 MG/DL (ref 7–30)
CALCIUM SERPL-MCNC: 8.1 MG/DL (ref 8.5–10.1)
CHLORIDE SERPL-SCNC: 107 MMOL/L (ref 94–109)
CO2 SERPL-SCNC: 26 MMOL/L (ref 20–32)
CREAT SERPL-MCNC: 1.28 MG/DL (ref 0.52–1.04)
GFR SERPL CREATININE-BSD FRML MDRD: 47 ML/MIN/{1.73_M2}
GLUCOSE SERPL-MCNC: 104 MG/DL (ref 70–99)
POTASSIUM SERPL-SCNC: 4.3 MMOL/L (ref 3.4–5.3)
SODIUM SERPL-SCNC: 138 MMOL/L (ref 133–144)

## 2019-01-19 PROCEDURE — 99239 HOSP IP/OBS DSCHRG MGMT >30: CPT | Performed by: INTERNAL MEDICINE

## 2019-01-19 PROCEDURE — 25000128 H RX IP 250 OP 636: Performed by: INTERNAL MEDICINE

## 2019-01-19 PROCEDURE — 25000132 ZZH RX MED GY IP 250 OP 250 PS 637: Performed by: INTERNAL MEDICINE

## 2019-01-19 PROCEDURE — 36415 COLL VENOUS BLD VENIPUNCTURE: CPT | Performed by: INTERNAL MEDICINE

## 2019-01-19 PROCEDURE — 80048 BASIC METABOLIC PNL TOTAL CA: CPT | Performed by: INTERNAL MEDICINE

## 2019-01-19 RX ORDER — AMLODIPINE BESYLATE 2.5 MG/1
2.5 TABLET ORAL DAILY
Qty: 30 TABLET | Refills: 0 | Status: SHIPPED | OUTPATIENT
Start: 2019-01-19 | End: 2019-02-26

## 2019-01-19 RX ORDER — CIPROFLOXACIN 500 MG/1
500 TABLET, FILM COATED ORAL 2 TIMES DAILY
Qty: 28 TABLET | Refills: 0 | Status: SHIPPED | OUTPATIENT
Start: 2019-01-19 | End: 2019-02-26

## 2019-01-19 RX ORDER — AMLODIPINE BESYLATE 2.5 MG/1
2.5 TABLET ORAL DAILY
Status: DISCONTINUED | OUTPATIENT
Start: 2019-01-19 | End: 2019-01-19 | Stop reason: HOSPADM

## 2019-01-19 RX ORDER — OXYBUTYNIN CHLORIDE 5 MG/1
5 TABLET ORAL 3 TIMES DAILY PRN
Status: DISCONTINUED | OUTPATIENT
Start: 2019-01-19 | End: 2019-01-19 | Stop reason: HOSPADM

## 2019-01-19 RX ORDER — OXYBUTYNIN CHLORIDE 5 MG/1
5 TABLET ORAL 3 TIMES DAILY PRN
Qty: 42 TABLET | Refills: 0 | Status: SHIPPED | OUTPATIENT
Start: 2019-01-19 | End: 2019-02-26

## 2019-01-19 RX ADMIN — TAZOBACTAM SODIUM AND PIPERACILLIN SODIUM 3.38 G: 375; 3 INJECTION, SOLUTION INTRAVENOUS at 14:00

## 2019-01-19 RX ADMIN — AMLODIPINE BESYLATE 2.5 MG: 2.5 TABLET ORAL at 08:40

## 2019-01-19 RX ADMIN — TAZOBACTAM SODIUM AND PIPERACILLIN SODIUM 3.38 G: 375; 3 INJECTION, SOLUTION INTRAVENOUS at 04:38

## 2019-01-19 RX ADMIN — POTASSIUM CHLORIDE AND SODIUM CHLORIDE: 900; 150 INJECTION, SOLUTION INTRAVENOUS at 06:40

## 2019-01-19 ASSESSMENT — ACTIVITIES OF DAILY LIVING (ADL)
ADLS_ACUITY_SCORE: 12

## 2019-01-19 NOTE — PLAN OF CARE
Elevated BP (hx of hptn), tachycardic. Low grade fever of 100, all other VSS. No C/O pain this shift. On IV zosyn for ARF d/t kidney stone and sepsis. Plan for pt to follow up with Urology in 1-2 weeks for stone removal. Plan for discharge today. Will continue to monitor.       AVS printed and discussed with pt and . All belongings packed and sent with pt. Meds given to pt. Questions asked and answered. Pt discharged with  18:40

## 2019-01-19 NOTE — PROGRESS NOTES
Note Infectious Disease Consult Service Progress Note  Covering for Rosie Michele & Gary   Pt Name Bre Caballero   Date 01/19/2019   MRN [id number]       Notes / labs / imaging test results and other data were reviewed    CHIEF COMPLAINT: REASON FOR VISIT    Fever / diarrhea / E coli sepsis      HPI  late last week, when she developed nausea and vomiting, malaise and feverish symptoms.  They persisted into the weekend with a lot of nausea and some degree of diarrhea evolving.  She finally sought medical attention at this point when it was improving and a blood culture from admission is growing E. coli.    ROS  Much better  No rigors today  No diarrhea  Less frequent urination  Able to eat  No shortness of breath  No rash    Remainder of 14-point ROS was negative except as listed above    PFSH:  Personal / Family / Social Histories were reviewed and updated  nothing new      CURRENT MED REVIEWD    Prescription Medications as of 1/19/2019       Rx Number Disp Refills Start End Last Dispensed Date Next Fill Date Owning Pharmacy    amLODIPine (NORVASC) 2.5 MG tablet  30 tablet 0 1/19/2019 2/18/2019   [place] [id number]    Sig: Take 1 tablet (2.5 mg) by mouth daily    Class: E-Prescribe    Route: Oral    Blood Pressure Monitor KIT    4/25/2017        Sig: Use to check blood pressure daily for 1-2 weeks    Class: Historical    ciprofloxacin (CIPRO) 500 MG tablet  28 tablet 0 1/19/2019 2/2/2019   [place] [id number]    Sig: Take 1 tablet (500 mg) by mouth 2 times daily for 14 days    Class: E-Prescribe    Route: Oral    oxybutynin (DITROPAN) 5 MG tablet  42 tablet 0 1/19/2019 2/2/2019   [place] [id number]    Sig: Take 1 tablet (5 mg) by mouth 3 times daily as needed for bladder spasms    Class: E-Prescribe    Route: Oral      Hospital Medications as of 1/19/2019   "     Dose Frequency Start End    acetaminophen (TYLENOL) tablet 650 mg 650 mg EVERY 4 HOURS PRN 1/15/2019     Sig: Take 2 tablets (650 mg) by mouth every 4 hours as needed for mild pain    Class: E-Prescribe    Route: Oral    amLODIPine (NORVASC) tablet 2.5 mg 2.5 mg DAILY 2019     Sig: Take 1 tablet (2.5 mg) by mouth daily    Class: E-Prescribe    Route: Oral    lidocaine (LMX4) cream  EVERY 1 HOUR PRN 1/15/2019     Sig: Apply topically every hour as needed for pain (with VAD insertion or accessing implanted port.)    Class: E-Prescribe    Route: Topical    lidocaine 1 % 1 mL 1 mL EVERY 1 HOUR PRN 1/15/2019     Si mL by Other route every hour as needed (mild pain with VAD insertion or accessing implanted port)    Class: E-Prescribe    Route: Other    melatonin tablet 1 mg 1 mg AT BEDTIME PRN 1/15/2019     Sig: Take 1 tablet (1 mg) by mouth nightly as needed for sleep    Class: E-Prescribe    Route: Oral    naloxone (NARCAN) injection 0.1-0.4 mg 0.1-0.4 mg EVERY 2 MIN PRN 2019     Sig: Inject 0.25-1 mLs (0.1-0.4 mg) into the vein every 2 minutes as needed for opioid reversal    Class: E-Prescribe    Route: Intravenous    naloxone (NARCAN) injection 0.1-0.4 mg 0.1-0.4 mg EVERY 2 MIN PRN 1/15/2019     Sig: Inject 0.25-1 mLs (0.1-0.4 mg) into the vein every 2 minutes as needed for opioid reversal    Class: E-Prescribe    Route: Intravenous    ondansetron (ZOFRAN) injection 4 mg 4 mg EVERY 6 HOURS PRN 1/15/2019     Sig: Inject 2 mLs (4 mg) into the vein every 6 hours as needed for nausea or vomiting    Class: E-Prescribe    Route: Intravenous    Linked Group 1:  \"Or\" Linked Group Details        ondansetron (ZOFRAN-ODT) ODT tab 4 mg 4 mg EVERY 6 HOURS PRN 1/15/2019     Sig: Take 1 tablet (4 mg) by mouth every 6 hours as needed for nausea or vomiting    Class: E-Prescribe    Route: Oral    Linked Group 1:  \"Or\" Linked Group Details        oxybutynin (DITROPAN) tablet 5 mg 5 mg 3 TIMES DAILY PRN 2019     " "Sig: Take 1 tablet (5 mg) by mouth 3 times daily as needed for bladder spasms    Class: E-Prescribe    Route: Oral    piperacillin-tazobactam (ZOSYN) infusion 3.375 g 3.375 g EVERY 6 HOURS 1/16/2019     Sig: Inject 50 mLs (3.375 g) into the vein every 6 hours    Class: E-Prescribe    Notes to Pharmacy: Pharmacy can adjust dose based on renal function.    Route: Intravenous    potassium chloride 10 mEq in 100 mL intermittent infusion with 10 mg lidocaine 10 mEq EVERY 1 HOUR PRN 1/15/2019     Sig: Inject 100 mLs (10 mEq) into the vein every hour as needed for potassium supplementation    Class: E-Prescribe    Route: Intravenous    prochlorperazine (COMPAZINE) injection 10 mg 10 mg EVERY 6 HOURS PRN 1/15/2019     Sig: Inject 2 mLs (10 mg) into the vein every 6 hours as needed for nausea or vomiting    Class: E-Prescribe    Route: Intravenous    Linked Group 2:  \"Or\" Linked Group Details        prochlorperazine (COMPAZINE) Suppository 25 mg 25 mg EVERY 12 HOURS PRN 1/15/2019     Sig: Place 1 suppository (25 mg) rectally every 12 hours as needed for nausea or vomiting    Class: E-Prescribe    Route: Rectal    Linked Group 2:  \"Or\" Linked Group Details        prochlorperazine (COMPAZINE) tablet 10 mg 10 mg EVERY 6 HOURS PRN 1/15/2019     Sig: Take 2 tablets (10 mg) by mouth every 6 hours as needed for vomiting    Class: E-Prescribe    Route: Oral    Linked Group 2:  \"Or\" Linked Group Details        sodium chloride (PF) 0.9% PF flush 3 mL 3 mL EVERY 1 HOUR PRN 1/15/2019     Sig: 3 mLs by Intracatheter route every hour as needed for line flush (for peripheral IV flush post IV meds)    Class: E-Prescribe    Route: Intracatheter    sodium chloride (PF) 0.9% PF flush 3 mL 3 mL EVERY 8 HOURS 1/15/2019     Sig: 3 mLs by Intracatheter route every 8 hours    Class: E-Prescribe    Route: Intracatheter    0.9% sodium chloride + KCl 20 mEq/L infusion (Discontinued)  CONTINUOUS 1/17/2019 1/19/2019    Sig: Inject into the vein " "continuous    Class: E-Prescribe    Route: Intravenous          Vital Signs: /90 (BP Location: Right arm)   Pulse 100   Temp 100.4  F (38  C) (Oral)   Resp 18   Ht 1.676 m (5' 6\")   Wt 93.8 kg (206 lb 11.2 oz)   SpO2 96%   BMI 33.36 kg/m    EXAM    constitution   In bed     neuro   Easily awakened from sleep  Normal speech / conversation     skin   No rash     abd   Soft / not tender     lungs   Clear  No cough     extrem   C/w stated age / gender       Data  Cultures        LABS  Lab Results   Component Value Date/Time    WBC 14.0 (H) 01/18/2019 06:36 AM    WBC 4.8 01/17/2019 08:13 AM    WBC 14.5 (H) 01/16/2019 08:16 AM    WBC 13.9 (H) 01/15/2019 12:34 PM    CREAT 2.1 (H) 01/15/2019 12:39 PM    AST 31 01/15/2019 12:34 PM    ALT 30 01/15/2019 12:34 PM    ALKPHOS 189 (H) 01/15/2019 12:34 PM              ASSESSMENT & SUGGESTIONS      (I10) Benign essential hypertension  (primary encounter diagnosis)  (A41.9) Sepsis, due to unspecified organism (H)  (N17.9) Acute renal failure, unspecified acute renal failure type (H)  (K52.9) Enteritis  (N20.0) Calculus of kidney    E coli sepsis (HC)  Leukocytosis  Fever    Much improved  Complicated by ureterolithiasis  The blood E coli isolate S in vitro to all antibiotics listed    OK w me home w 14 days antibiotic  Options -- cefpodoxime / cipro / cefuroxime  I advised pt to call Dr Michele' office Monday to inquire about \"ID follow up\"    Reviewed plan / options w pt &   TT 30 min / > 50 % CC -            Nick Cerrato MD  Covering for Rsoie Rodriguez & Ryne & Gary  [facility] Infectious Diseases  [phone]   "

## 2019-01-19 NOTE — PLAN OF CARE
A/Ox4, Independent in room, VSS - afebrile Tmax 98.5, RA, LS clear, denies pain, esparza removed on PM shift - voiding spontaneously without difficulty, loose stool x1 this shift, NS +20KCL infusing in L PIV at 75ml/hr, IV zosyn, plan to discharge home in 2 days, ID and urology following, continue with plan of care.

## 2019-01-19 NOTE — PLAN OF CARE
Afebrile, tachycardic, all other VSS. A&O. On RA, sating appropriately. No C/O pain this shift. Conn removed, pt voiding appropriately. Pt still complains of loose stools, but they are less frequent. D5NS+K running at 75ml/hr. On IV zosyn for Kidney infection. Plan for discharge 2 days to home. Will continue to monitor.

## 2019-01-19 NOTE — PROGRESS NOTES
Urology Note    Urosepsis secondary to left proximal ureteral stone. Stent placed 1/17.  Clinically doing well - no fevers overnight. T max 100.7 yesterday  Conn now removed  Noting urinary frequency    POD 2 s/p left ureteral stent placement in setting of stone and urosepsis.  Oxybutynin for bladder spasms and stent discomfort  Continue empiric antibiotics - cultures pending - ID following  Will need at least 2 weeks of abx and will subsequently require outpatient management of her stone.  Urology will follow    Raghavendra Gunn MD  Urology  [facility]  Clinic Phone [phone]

## 2019-01-19 NOTE — DISCHARGE SUMMARY
[facility]  Discharge Summary  Name: Bre Caballero    MRN: [id number]  YOB: 1965    Age: 53 year old  Date of Discharge:  1/19/2019  Date of Admission: 1/15/2019  Primary Care Provider: Georgiana Ref-Primary, Physician  Discharge Physician:  Owen Baez MD  Discharging Service:  Hospitalist      Discharge Diagnosis:  Sepsis secondary to UTI, left proximal ureteral stone with hydronephrosis, complicated with mild hydronephrosis with isolated pansensitive E. coli bacteremia  Acute kidney injury secondary to acute tubular necrosis from #1  Hypertension     Other Diagnosis:  Past Medical History:   Diagnosis Date     Hypertension           Discharge Disposition:  Discharged to home     Allergies:  No Known Allergies     Discharge Medications:   Current Discharge Medication List      START taking these medications    Details   amLODIPine (NORVASC) 2.5 MG tablet Take 1 tablet (2.5 mg) by mouth daily  Qty: 30 tablet, Refills: 0    Associated Diagnoses: Benign essential hypertension      ciprofloxacin (CIPRO) 500 MG tablet Take 1 tablet (500 mg) by mouth 2 times daily for 14 days  Qty: 28 tablet, Refills: 0    Associated Diagnoses: Sepsis, due to unspecified organism (H)      oxybutynin (DITROPAN) 5 MG tablet Take 1 tablet (5 mg) by mouth 3 times daily as needed for bladder spasms  Qty: 42 tablet, Refills: 0    Associated Diagnoses: Calculus of kidney         CONTINUE these medications which have NOT CHANGED    Details   Blood Pressure Monitor KIT Use to check blood pressure daily for 1-2 weeks         STOP taking these medications       lisinopril (PRINIVIL/ZESTRIL) 10 MG tablet Comments:   Reason for Stopping:         Vitamin D, Cholecalciferol, 1000 units TABS Comments:   Reason for Stopping:                Condition on Discharge:  Discharge condition: Stable   Discharge vitals: Blood pressure 156/90, pulse 100, temperature 100.4  F (38  C), temperature source Oral, resp. rate 18, height 1.676 m  "(5' 6\"), weight 93.8 kg (206 lb 11.2 oz), SpO2 96 %.   Code status on discharge: Full Code     History of Present Illness:  See detailed admission note for full details.        Significant Physical Exam Findings Day of Discharge:  HEENT; Atraumatic, normocephalic, pinkish conjuctiva, pupils bilateral reactive   Skin: warm and moist, no rashes  Lymphatics: no cervical or axillary lymphandenopathy  Lungs: equal chest expansion, clear to auscultation, no wheezes, no stridor, no crackles,   Heart: normal rate, normal rhythm, no rubs or gallops.   Abdomen: normal bowel sounds, no tenderness, no peritoneal signs, no guarding  Extremities: no deformities, no edema   Neuro; follow commands, alert and oriented x3, spontaneous speech, coherent, moves all extremities spontaneously  Psych; no hallucination, euthymic mood, not agitated        Procedures other than Imaging:  Left ureteral stent placement     Imaging:  Results for orders placed or performed during the hospital encounter of 01/15/19   CT Abdomen Pelvis w/o Contrast    Narrative    CT ABDOMEN AND PELVIS WITHOUT CONTRAST   1/17/2019 9:52 AM     HISTORY: Sepsis.    TECHNIQUE: Volumetric helical sections were acquired from the lung  bases through the ischial tuberosities without IV contrast. Coronal  images were also reconstructed. Radiation dose for this scan was  reduced using automated exposure control, adjustment of the mA and/or  kV according to patient size, or iterative reconstruction technique.    COMPARISON: None.    FINDINGS: There is an obstructing 1 x 0.5 cm stone near the left  ureteropelvic junction, causing mild left hydronephrosis and  perinephric stranding. No other urinary calculi are identified. No  hydronephrosis on the right. Partially exophytic cyst in the upper  pole of the right kidney measures 4.9 cm. Prior cholecystectomy.  Diffuse fatty infiltration of the liver. A 3.2 cm low-density lesion  in the medial segment of the left hepatic lobe " could represent an area  of prominent focal fatty infiltration but is not well characterized on  this noncontrast scan and is considered indeterminate. The spleen,  adrenal glands, and pancreas have unremarkable noncontrast  appearances. No bowel obstruction. No evidence for colitis or  diverticulitis. Unremarkable appendix. No free fluid in the pelvis.  The visualized lung bases are clear.      Impression    IMPRESSION:   1. There is an obstructing 1 cm stone near the left ureteropelvic  junction causing mild left hydronephrosis.  2. Diffuse fatty infiltration of the liver.  3. An indeterminate 3.2 cm low-density lesion in the medial segment of  the left hepatic lobe could represent an area of prominent focal fatty  infiltration but is not definitively characterized. Liver MRI may be  helpful for more definitive characterization.    JUAN MIGUEL BARTON MD   XR Surgery MADISYN Fluoro L/T 5 Min w Stills    Narrative    This exam was marked as non-reportable because it will not be read by a   radiologist or a [place] non-radiologist provider.                    Consultations:  Consultation during this admission received from infectious disease and urology.     Recent Lab Results:  Recent Labs   Lab 01/18/19  0636 01/17/19  0813 01/16/19  0816   WBC 14.0* 4.8 14.5*   HGB 10.0* 10.9* 11.5*   HCT 30.7* 34.5* 35.4   MCV 83 84 84    191 270     Recent Labs   Lab 01/15/19  1505 01/15/19  1232   CULT No growth after 4 days Cultured on the 1st day of incubation:  Escherichia coli  Susceptibility testing in progress  *  Critical Value/Significant Value, preliminary result only, called to and read back by  Tahmina Robertson,  KEELY [id number], RN 0503 1.16.19 NITA    Cultured on the 1st day of incubation:  Strain 2  Escherichia coli  *  (Note)  POSITIVE for E.COLI by Verigene multiplex nucleic acid test. Final  identification and antimicrobial susceptibility testing will be  verified by standard methods. Verigene test will not  distinguish  E.coli from Shigella species including S.dysenteriae, S.flexneri,  S.boydii, and S.sonnei. Specimens containing Shigella species or  E.coli will be reported as Positive for E.coli.      Specimen tested with Verigene multiplex, gram-negative blood culture  nucleic acid test for the following targets: Acinetobacter sp.,  Citrobacter sp., Enterobacter sp., Proteus sp., E. coli, K.  pneumoniae/oxytoca, P. aeruginosa, and the following resistance  markers: CTXM, KPC, NDM, VIM, IMP and OXA.      Critical Value/Significant Value called to and read back by Kristina Dixon RN on 1.16.2019 at 0729 by .           Recent Labs   Lab 01/19/19  0639 01/18/19  0636 01/17/19  0813  01/15/19  1234    138 139   < > 132*   POTASSIUM 4.3 3.8 3.6   < > 3.3*   CHLORIDE 107 107 106   < > 101   CO2 26 24 23   < > 22   ANIONGAP 5 7 10   < > 9   * 93 108*   < > 151*   BUN 15 17 14   < > 37*   CR 1.28* 1.37* 1.48*   < > 2.12*   GFRESTIMATED 47* 44* 40*   < > 26*   GFRESTBLACK 55* 51* 46*   < > 30*   CHERI 8.1* 8.1* 8.4*   < > 9.2   PROTTOTAL  --   --   --   --  7.7   ALBUMIN  --   --   --   --  2.9*   BILITOTAL  --   --   --   --  0.6   ALKPHOS  --   --   --   --  189*   AST  --   --   --   --  31   ALT  --   --   --   --  30    < > = values in this interval not displayed.     Recent Labs   Lab 01/19/19  0639 01/18/19  0636 01/17/19  0813 01/16/19  0816 01/15/19  1239   * 93 108* 146* 166*     Recent Labs   Lab 01/17/19  1606 01/15/19  1234   LACT 1.2 2.4*     No results for input(s): TROPONIN, TROPI, TROPR in the last 168 hours.    Invalid input(s): TROP, TROPONINIES  Recent Labs   Lab 01/15/19  2255   COLOR Yellow   APPEARANCE Slightly Cloudy   URINEGLC Negative   URINEBILI Negative   URINEKETONE Negative   SG 1.014   UBLD Large*   URINEPH 5.0   PROTEIN 30*   NITRITE Negative   LEUKEST Negative   RBCU <1   WBCU 10*          Pending Results:    Unresulted Labs Ordered in the Past 30 Days of this Admission     Date  and Time Order Name Status Description    1/15/2019 1428 Blood culture Preliminary     1/15/2019 1418 Blood culture Preliminary            Discharge Instructions and Follow-Up:   Discharge diet: Orders Placed This Encounter      Regular Diet Adult      Diet     Discharge activity: Activity as tolerated   Discharge follow-up: 1-2 weeks with PCP, follow-up with urology   Outpatient therapy: None    Other instructions: None      Hospital Course:  Bre continues to improve with stable hemodynamics.  Tolerating more of oral diet with no recurrence of any nausea, vomiting or diarrhea she is denying any abdominal pain.  No reported mental status changes.  Not requiring any oxygen supplementation.  She improved remarkably after left ureteral stent placement.  Voiding freely off Conn catheter with no hematuria.  She will be sent home with ciprofloxacin twice daily for the next 14 days.  Highly recommend to have a repeat metabolic panel next week as her creatinine still mildly elevated at 1.3 and being sent home with quinolones.  Instructions provided regarding follow-up care with PCP and urology with plans of further intervention such as lithotripsy for her known left UPJ stone.  Her ACE inhibitors were held due to recent AK I and substituted with amlodipine upon discharge.    See excerpts of my prior progress note for other details of her hospitalization:    Summary of Stay: Bre Caballero is a 53 year old female known prior history of hypertension and was in her usual state of health until several days prior to this hospitalization when she started having nausea, vomiting, diarrhea and was admitted on 1/15/2019 with dehydration and renal failure.     Problem List:   1. Sepsis with presenting leukocytosis, fever, tachycardia, lactic acidosis now with isolated E. coli bacteremia  2. Obstructing 1 cm UPJ stone with hydronephrosis, status post left proximal ureteral calculus with findings of left pyonephrosis  3. Acute  kidney injury secondary to #1, prerenal with dehydration, suspected acute tubular necrosis also given sepsis.  4. Hypertension     Continue inpatient care.  May be able to go home later today if we know the sensitivities of the isolated E. coli.   Creatinine improving.  Stop IV fluids.  Continue to hold home regimen of lisinopril for blood pressure control.  Will initiate low-dose Norvasc today.    ID service following with us  DVT Prophylaxis: Pneumatic Compression Devices  Code Status: Full Code  Discharge Dispo: Home  Estimated Disch Date / # of Days until Disch: Possible discharge later today if sensitivity panel of isolated E. coli is available and sensitive to oral antibiotics.         Total time spent in face to face contact with the patient and coordinating discharge was:  >30 Minutes.

## 2019-01-19 NOTE — OP NOTE
Procedure Date: 01/17/2019      PREOPERATIVE DIAGNOSES:  Left ureteral calculus, urosepsis.      POSTOPERATIVE DIAGNOSES:  Left ureteral calculus, urosepsis, left pyonephrosis.      PROCEDURE PERFORMED:  Video cystoscopy, placement of double-J stent (left 6-Gu[place]ian by 26 cm).      SURGEON:  Terence Castillo MD       ANESTHESIA:  General laryngeal mask.      ESTIMATED BLOOD LOSS:  0 mL.      INDICATIONS:  The patient is a 53-year-old female who has had fever and an elevated creatinine.  She was found this morning to have an obstructing stone in the proximal left ureter with hydronephrosis.  She has been spiking fevers the last few days and is on IV antibiotics.  She now comes to the operating room emergently for left double-J stent placement.  The procedure, alternatives, risks and followup were carefully discussed with the patient and her .      DESCRIPTION OF PROCEDURE:  The patient was taken to Cystoscopy and placed supine on the operating table.  After adequate general laryngeal mask anesthesia, the patient was placed in lithotomy and her genitalia were prepped and draped in a sterile fashion.  A #22 [place] Storz cystoscope with obturator was gently introduced in the bladder and she is noted to have a large cystocele.  There were no stones or tumors in the bladder.  The left ureteral orifice was identified, and I passed a Glidewire up the ureter, past her proximal left ureteral stone until it curled in the left renal pelvis.  I then passed a 6-[place] by 24 cm stent based on her height of 5 feet 6 inches and it was too short.  I removed this stent and passed a 6-[place] by 26 cm stent that was proper length but I could not get the stent to curl completely in the left kidney, probably because of a small left renal pelvis.  There was copious purulent discharge from the left ureter after placing the stent.  The cystoscope was removed and a 16-[place] Conn was inserted in the bladder.  Then, 5 mL of saline was  placed in the Conn balloon.  The Conn was placed to closed gravity drainage.        The patient went to the recovery room in stable condition and will be readmitted to the floor for close observation and continued IV antibiotic therapy.         DRISS LEES JR, MD             D: 2019   T: 2019   MT: ANABELL      Name:     IDALIA GAY   MRN:      [id number]        Account:        [id number]   :      1965           Procedure Date: 2019      Document: [id number]

## 2019-01-19 NOTE — PROGRESS NOTES
"[facility]  Hospitalist Progress Note  Owen Baez MD, MD 01/19/2019  (Text Page)  Reason for Stay (Diagnosis): sepsis with solated E coli suspect UTI related?         Assessment and Plan:      Summary of Stay: Bre Caballero is a 53 year old female known prior history of hypertension and was in her usual state of health until several days prior to this hospitalization when she started having nausea, vomiting, diarrhea and was admitted on 1/15/2019 with dehydration and renal failure.    Problem List:   1. Sepsis with presenting leukocytosis, fever, tachycardia, lactic acidosis now with isolated E. coli bacteremia  2. Obstructing 1 cm UPJ stone with hydronephrosis, status post left proximal ureteral calculus with findings of left pyonephrosis  3. Acute kidney injury secondary to #1, prerenal with dehydration, suspected acute tubular necrosis also given sepsis.  4. Hypertension    Continue inpatient care.  May be able to go home later today if we know the sensitivities of the isolated E. coli.   Creatinine improving.  Stop IV fluids.  Continue to hold home regimen of lisinopril for blood pressure control.  Will initiate low-dose Norvasc today.    ID service following with us  DVT Prophylaxis: Pneumatic Compression Devices  Code Status: Full Code  Discharge Dispo: Home  Estimated Disch Date / # of Days until Disch: Possible discharge later today if sensitivity panel of isolated E. coli is available and sensitive to oral antibiotics.        Interval History (Subjective):      Continuing care today.  Seen and examined.  Chart reviewed.    No significant events overnight.  Remain afebrile.  No chills.  Tolerating oral diet.  Had a formed bowel movement earlier today.              Physical Exam:      Last Vital Signs:  BP (!) 145/95   Pulse 100   Temp 98.8  F (37.1  C) (Oral)   Resp 20   Ht 1.676 m (5' 6\")   Wt 93.8 kg (206 lb 11.2 oz)   SpO2 98%   BMI 33.36 kg/m      I/O last 3 completed " shifts:  In: 3160 [P.O.:240; I.V.:2920]  Out: 1100 [Urine:1100]  Wt Readings from Last 1 Encounters:   01/15/19 93.8 kg (206 lb 11.2 oz)     Vitals:    01/15/19 1206 01/15/19 1846   Weight: 88.5 kg (195 lb) 93.8 kg (206 lb 11.2 oz)       Constitutional: Awake, alert, cooperative, no apparent distress   Respiratory: Clear to auscultation bilaterally, no crackles or wheezing   Cardiovascular: Regular rate and rhythm, normal S1 and S2, and no murmur noted   Abdomen: Normal bowel sounds, soft, non-distended, non-tender   Skin: No rashes, no cyanosis, dry to touch   Neuro: Alert and oriented x3, no weakness, spontaneous and coherent speech   Extremities: No edema, normal range of motion   Other(s): Euthymic mood, not agitated       All other systems: Negative          Medications:      All current medications were reviewed with changes reflected in problem list.         Data:      All new lab and imaging data was reviewed.   Labs:  Recent Labs   Lab 01/18/19  0636 01/17/19  0813 01/16/19  0816   WBC 14.0* 4.8 14.5*   HGB 10.0* 10.9* 11.5*   HCT 30.7* 34.5* 35.4   MCV 83 84 84    191 270     Recent Labs   Lab 01/19/19  0639 01/18/19  0636 01/17/19  0813  01/15/19  1234    138 139   < > 132*   POTASSIUM 4.3 3.8 3.6   < > 3.3*   CHLORIDE 107 107 106   < > 101   CO2 26 24 23   < > 22   ANIONGAP 5 7 10   < > 9   * 93 108*   < > 151*   BUN 15 17 14   < > 37*   CR 1.28* 1.37* 1.48*   < > 2.12*   GFRESTIMATED 47* 44* 40*   < > 26*   GFRESTBLACK 55* 51* 46*   < > 30*   CHERI 8.1* 8.1* 8.4*   < > 9.2   PROTTOTAL  --   --   --   --  7.7   ALBUMIN  --   --   --   --  2.9*   BILITOTAL  --   --   --   --  0.6   ALKPHOS  --   --   --   --  189*   AST  --   --   --   --  31   ALT  --   --   --   --  30    < > = values in this interval not displayed.     Recent Labs   Lab 01/19/19  0639 01/18/19  0636 01/17/19  0813 01/16/19  0816 01/15/19  1239   * 93 108* 146* 166*     No results for input(s): INR in the last  168 hours.  No results for input(s): TROPONIN, TROPI, TROPR in the last 168 hours.    Invalid input(s): TROP, TROPONINIES  Recent Labs   Lab 01/15/19  7055   COLOR Yellow   APPEARANCE Slightly Cloudy   URINEGLC Negative   URINEBILI Negative   URINEKETONE Negative   SG 1.014   UBLD Large*   URINEPH 5.0   PROTEIN 30*   NITRITE Negative   LEUKEST Negative   RBCU <1   WBCU 10*      Imaging:   No results found for this or any previous visit  CT abdomen and pelvis showed previously described 1 cm UPJ stone with accompanying hydronephrosis

## 2019-01-19 NOTE — PROVIDER NOTIFICATION
"Md paged [phone]: \"Rm [id number] - Pt/family requesting follow-up appointment details with urology to be put in AVS. Thanks!\"   "

## 2019-01-20 LAB
BACTERIA SPEC CULT: ABNORMAL
Lab: ABNORMAL
SPECIMEN SOURCE: ABNORMAL

## 2019-01-20 NOTE — DISCHARGE INSTRUCTIONS
Raghavendra Gunn MD  Urology  [facility]  Clinic Phone [phone]    Follow up 1-2 weeks

## 2019-01-21 LAB
BACTERIA SPEC CULT: NO GROWTH
Lab: NORMAL
SPECIMEN SOURCE: NORMAL

## 2019-01-22 DIAGNOSIS — N13.30 HYDRONEPHROSIS: ICD-10-CM

## 2019-01-22 DIAGNOSIS — N20.1 URETERAL STONE: Primary | ICD-10-CM

## 2019-01-30 ENCOUNTER — HOSPITAL ENCOUNTER (OUTPATIENT)
Dept: GENERAL RADIOLOGY | Facility: CLINIC | Age: 54
Discharge: HOME | End: 2019-01-30
Admitting: UROLOGY
Payer: COMMERCIAL

## 2019-01-30 ENCOUNTER — OFFICE VISIT (OUTPATIENT)
Dept: UROLOGY | Facility: CLINIC | Age: 54
End: 2019-01-30
Payer: COMMERCIAL

## 2019-01-30 VITALS
BODY MASS INDEX: 32.14 KG/M2 | WEIGHT: 200 LBS | HEART RATE: 112 BPM | OXYGEN SATURATION: 97 % | DIASTOLIC BLOOD PRESSURE: 80 MMHG | HEIGHT: 66 IN | SYSTOLIC BLOOD PRESSURE: 122 MMHG

## 2019-01-30 DIAGNOSIS — N20.1 URETERAL STONE: Primary | ICD-10-CM

## 2019-01-30 DIAGNOSIS — N20.1 URETERAL STONE: ICD-10-CM

## 2019-01-30 DIAGNOSIS — Z87.440 PERSONAL HISTORY OF URINARY TRACT INFECTION: ICD-10-CM

## 2019-01-30 DIAGNOSIS — N13.30 HYDRONEPHROSIS: ICD-10-CM

## 2019-01-30 DIAGNOSIS — N20.1 LEFT URETERAL CALCULUS: Primary | ICD-10-CM

## 2019-01-30 LAB
ALBUMIN UR-MCNC: 100 MG/DL
APPEARANCE UR: CLEAR
BILIRUB UR QL STRIP: NEGATIVE
COLOR UR AUTO: YELLOW
GLUCOSE UR STRIP-MCNC: NEGATIVE MG/DL
HGB UR QL STRIP: ABNORMAL
KETONES UR STRIP-MCNC: NEGATIVE MG/DL
LEUKOCYTE ESTERASE UR QL STRIP: ABNORMAL
NITRATE UR QL: NEGATIVE
PH UR STRIP: 5.5 PH (ref 5–7)
SOURCE: ABNORMAL
SP GR UR STRIP: >1.03 (ref 1–1.03)
UROBILINOGEN UR STRIP-ACNC: 0.2 EU/DL (ref 0.2–1)

## 2019-01-30 PROCEDURE — 81003 URINALYSIS AUTO W/O SCOPE: CPT | Performed by: UROLOGY

## 2019-01-30 PROCEDURE — 87086 URINE CULTURE/COLONY COUNT: CPT | Performed by: UROLOGY

## 2019-01-30 PROCEDURE — 74019 RADEX ABDOMEN 2 VIEWS: CPT

## 2019-01-30 PROCEDURE — 99213 OFFICE O/P EST LOW 20 MIN: CPT | Performed by: UROLOGY

## 2019-01-30 RX ORDER — CIPROFLOXACIN 2 MG/ML
400 INJECTION, SOLUTION INTRAVENOUS
Status: CANCELLED | OUTPATIENT
Start: 2019-01-30

## 2019-01-30 ASSESSMENT — MIFFLIN-ST. JEOR: SCORE: 1528.94

## 2019-01-30 ASSESSMENT — PAIN SCALES - GENERAL: PAINLEVEL: NO PAIN (0)

## 2019-01-30 NOTE — PROGRESS NOTES
Bre Caballero is a very pleasant 53-year-old female with her first stone, a 1 cm stoneonce in the proximal left ureter.  This was stented 2 weeks ago and she had pyonephrosis proximal ureter. Pyelonephrosis with E. coli.n with the co. Her urine has cleared and she is now here with the skin. her urine has cleared and she is now here with a KUB did discuss definitive treatment.  L her KUB shows the stone clearly  Her KUB shows the stone clearly at the ureteropelvic junction,  at the ureteropelvic junction, or proximal left ureter. or proximal left ureter.  O the stent is in good position. The stent is positioned.  Other past medical history: Hypertension, cholecystectomy, non-smoker  Medications: Amlodipine,oxybutynin-stopped  Allergies: None  Exam: Alert and oriented, with spouse, normal vital signs  Assessment: 1 cm proximal left ureteral calculus. Discussed surgical options, risks  And follow-up She'll need a metabolic stone evaluation postop with intermittent consultants.  Discussed bleeding from kidney with lithotripsy including hematoma. Article on ESWL given to patient  Plan: Left ESWL,, Cipro 400 mg IV preop No anticoagulants 10 days before procedure-discussed meds

## 2019-01-30 NOTE — LETTER
1/30/2019      RE: Bre Caballero  [address]  [place] [address]       Bre Caballero is a very pleasant 53-year-old female with her first stone, a 1 cm stoneonce in the proximal left ureter.  This was stented 2 weeks ago and she had pyonephrosis proximal ureter. Pyelonephrosis with E. coli.n with the co. Her urine has cleared and she is now here with the skin. her urine has cleared and she is now here with a KUB did discuss definitive treatment.  L her KUB shows the stone clearly  Her KUB shows the stone clearly at the ureteropelvic junction,  at the ureteropelvic junction, or proximal left ureter. or proximal left ureter.  O the stent is in good position. The stent is positioned.  Other past medical history: Hypertension, cholecystectomy, non-smoker  Medications: Amlodipine,oxybutynin-stopped  Allergies: None  Exam: Alert and oriented, with spouse, normal vital signs  Assessment: 1 cm proximal left ureteral calculus. Discussed surgical options, risks  And follow-up She'll need a metabolic stone evaluation postop with intermittent consultants.  Discussed bleeding from kidney with lithotripsy including hematoma. Article on ESWL given to patient  Plan: Left ESWL,, Cipro 400 mg IV preop No anticoagulants 10 days before procedure-discussed meds      Terence Castillo MD

## 2019-01-30 NOTE — LETTER
1/30/2019       RE: Bre Caballero  [address]  [place] [address]     Dear Colleague,    Thank you for referring your patient, Bre Caballero, to the [facility] [place] [facility]. Please see a copy of my visit note below.    Bre Caballero is a very pleasant 53-year-old female with her first stone, a 1 cm stoneonce in the proximal left ureter.  This was stented 2 weeks ago and she had pyonephrosis proximal ureter. Pyelonephrosis with E. coli.n with the co. Her urine has cleared and she is now here with the skin. her urine has cleared and she is now here with a KUB did discuss definitive treatment.  L her KUB shows the stone clearly  Her KUB shows the stone clearly at the ureteropelvic junction,  at the ureteropelvic junction, or proximal left ureter. or proximal left ureter.  O the stent is in good position. The stent is positioned.  Other past medical history: Hypertension, cholecystectomy, non-smoker  Medications: Amlodipine,oxybutynin-stopped  Allergies: None  Exam: Alert and oriented, with spouse, normal vital signs  Assessment: 1 cm proximal left ureteral calculus. Discussed surgical options, risks  And follow-up She'll need a metabolic stone evaluation postop with intermittent consultants.  Discussed bleeding from kidney with lithotripsy including hematoma. Article on ESWL given to patient  Plan: Left ESWL,, Cipro 400 mg IV preop No anticoagulants 10 days before procedure-discussed meds      Again, thank you for allowing me to participate in the care of your patient.      Sincerely,    Terence Castillo MD

## 2019-01-30 NOTE — NURSING NOTE
Chief Complaint   Patient presents with     RECHECK     Pt is here to review the imaging.      Lanette Recinos CMA

## 2019-01-31 LAB
BACTERIA SPEC CULT: NORMAL
Lab: NORMAL
SPECIMEN SOURCE: NORMAL

## 2019-02-07 ENCOUNTER — HOSPITAL ENCOUNTER (OUTPATIENT)
Facility: CLINIC | Age: 54
Discharge: HOME | End: 2019-02-07
Attending: UROLOGY | Admitting: UROLOGY
Payer: COMMERCIAL

## 2019-02-07 ENCOUNTER — ANESTHESIA (OUTPATIENT)
Dept: SURGERY | Facility: CLINIC | Age: 54
End: 2019-02-07
Payer: COMMERCIAL

## 2019-02-07 ENCOUNTER — ANESTHESIA EVENT (OUTPATIENT)
Dept: SURGERY | Facility: CLINIC | Age: 54
End: 2019-02-07
Payer: COMMERCIAL

## 2019-02-07 VITALS
OXYGEN SATURATION: 98 % | SYSTOLIC BLOOD PRESSURE: 141 MMHG | RESPIRATION RATE: 16 BRPM | TEMPERATURE: 98.1 F | BODY MASS INDEX: 31.02 KG/M2 | HEIGHT: 66 IN | DIASTOLIC BLOOD PRESSURE: 91 MMHG | WEIGHT: 193 LBS | HEART RATE: 83 BPM

## 2019-02-07 DIAGNOSIS — N20.1 LEFT URETERAL CALCULUS: ICD-10-CM

## 2019-02-07 LAB — HCG UR QL: NEGATIVE

## 2019-02-07 PROCEDURE — 36000093 ZZH SURGERY LEVEL 4 1ST 30 MIN: Performed by: UROLOGY

## 2019-02-07 PROCEDURE — 40000306 ZZH STATISTIC PRE PROC ASSESS II: Performed by: UROLOGY

## 2019-02-07 PROCEDURE — 25000125 ZZHC RX 250: Performed by: ANESTHESIOLOGY

## 2019-02-07 PROCEDURE — 50590 FRAGMENTING OF KIDNEY STONE: CPT | Mod: LT | Performed by: UROLOGY

## 2019-02-07 PROCEDURE — 37000009 ZZH ANESTHESIA TECHNICAL FEE, EACH ADDTL 15 MIN: Performed by: UROLOGY

## 2019-02-07 PROCEDURE — 36000063 ZZH SURGERY LEVEL 4 EA 15 ADDTL MIN: Performed by: UROLOGY

## 2019-02-07 PROCEDURE — 25000128 H RX IP 250 OP 636: Performed by: NURSE ANESTHETIST, CERTIFIED REGISTERED

## 2019-02-07 PROCEDURE — 25000128 H RX IP 250 OP 636: Performed by: ANESTHESIOLOGY

## 2019-02-07 PROCEDURE — 71000027 ZZH RECOVERY PHASE 2 EACH 15 MINS: Performed by: UROLOGY

## 2019-02-07 PROCEDURE — 37000008 ZZH ANESTHESIA TECHNICAL FEE, 1ST 30 MIN: Performed by: UROLOGY

## 2019-02-07 PROCEDURE — 81025 URINE PREGNANCY TEST: CPT | Performed by: ANESTHESIOLOGY

## 2019-02-07 PROCEDURE — 25000128 H RX IP 250 OP 636: Performed by: UROLOGY

## 2019-02-07 PROCEDURE — 71000012 ZZH RECOVERY PHASE 1 LEVEL 1 FIRST HR: Performed by: UROLOGY

## 2019-02-07 RX ORDER — FENTANYL CITRATE 50 UG/ML
25-50 INJECTION, SOLUTION INTRAMUSCULAR; INTRAVENOUS EVERY 5 MIN PRN
Status: DISCONTINUED | OUTPATIENT
Start: 2019-02-07 | End: 2019-02-07 | Stop reason: HOSPADM

## 2019-02-07 RX ORDER — FENTANYL CITRATE 50 UG/ML
INJECTION, SOLUTION INTRAMUSCULAR; INTRAVENOUS PRN
Status: DISCONTINUED | OUTPATIENT
Start: 2019-02-07 | End: 2019-02-07

## 2019-02-07 RX ORDER — OXYCODONE HYDROCHLORIDE 5 MG/1
5 TABLET ORAL EVERY 4 HOURS PRN
Status: DISCONTINUED | OUTPATIENT
Start: 2019-02-07 | End: 2019-02-07 | Stop reason: HOSPADM

## 2019-02-07 RX ORDER — DEXAMETHASONE SODIUM PHOSPHATE 4 MG/ML
INJECTION, SOLUTION INTRA-ARTICULAR; INTRALESIONAL; INTRAMUSCULAR; INTRAVENOUS; SOFT TISSUE PRN
Status: DISCONTINUED | OUTPATIENT
Start: 2019-02-07 | End: 2019-02-07

## 2019-02-07 RX ORDER — ONDANSETRON 2 MG/ML
4 INJECTION INTRAMUSCULAR; INTRAVENOUS EVERY 30 MIN PRN
Status: DISCONTINUED | OUTPATIENT
Start: 2019-02-07 | End: 2019-02-07 | Stop reason: HOSPADM

## 2019-02-07 RX ORDER — ALBUTEROL SULFATE 0.83 MG/ML
2.5 SOLUTION RESPIRATORY (INHALATION) EVERY 4 HOURS PRN
Status: DISCONTINUED | OUTPATIENT
Start: 2019-02-07 | End: 2019-02-07 | Stop reason: HOSPADM

## 2019-02-07 RX ORDER — ONDANSETRON 4 MG/1
4 TABLET, ORALLY DISINTEGRATING ORAL EVERY 30 MIN PRN
Status: DISCONTINUED | OUTPATIENT
Start: 2019-02-07 | End: 2019-02-07 | Stop reason: HOSPADM

## 2019-02-07 RX ORDER — SODIUM CHLORIDE, SODIUM LACTATE, POTASSIUM CHLORIDE, CALCIUM CHLORIDE 600; 310; 30; 20 MG/100ML; MG/100ML; MG/100ML; MG/100ML
INJECTION, SOLUTION INTRAVENOUS CONTINUOUS
Status: DISCONTINUED | OUTPATIENT
Start: 2019-02-07 | End: 2019-02-07 | Stop reason: HOSPADM

## 2019-02-07 RX ORDER — HYDROMORPHONE HYDROCHLORIDE 1 MG/ML
.3-.5 INJECTION, SOLUTION INTRAMUSCULAR; INTRAVENOUS; SUBCUTANEOUS EVERY 10 MIN PRN
Status: DISCONTINUED | OUTPATIENT
Start: 2019-02-07 | End: 2019-02-07 | Stop reason: HOSPADM

## 2019-02-07 RX ORDER — METOPROLOL TARTRATE 1 MG/ML
1-2 INJECTION, SOLUTION INTRAVENOUS EVERY 5 MIN PRN
Status: DISCONTINUED | OUTPATIENT
Start: 2019-02-07 | End: 2019-02-07 | Stop reason: HOSPADM

## 2019-02-07 RX ORDER — CIPROFLOXACIN 2 MG/ML
400 INJECTION, SOLUTION INTRAVENOUS
Status: COMPLETED | OUTPATIENT
Start: 2019-02-07 | End: 2019-02-07

## 2019-02-07 RX ORDER — NALOXONE HYDROCHLORIDE 0.4 MG/ML
.1-.4 INJECTION, SOLUTION INTRAMUSCULAR; INTRAVENOUS; SUBCUTANEOUS
Status: DISCONTINUED | OUTPATIENT
Start: 2019-02-07 | End: 2019-02-07 | Stop reason: HOSPADM

## 2019-02-07 RX ORDER — FENTANYL CITRATE 50 UG/ML
25-50 INJECTION, SOLUTION INTRAMUSCULAR; INTRAVENOUS
Status: DISCONTINUED | OUTPATIENT
Start: 2019-02-07 | End: 2019-02-07 | Stop reason: HOSPADM

## 2019-02-07 RX ORDER — MEPERIDINE HYDROCHLORIDE 50 MG/ML
12.5 INJECTION INTRAMUSCULAR; INTRAVENOUS; SUBCUTANEOUS
Status: DISCONTINUED | OUTPATIENT
Start: 2019-02-07 | End: 2019-02-07 | Stop reason: HOSPADM

## 2019-02-07 RX ORDER — LIDOCAINE 40 MG/G
CREAM TOPICAL
Status: DISCONTINUED | OUTPATIENT
Start: 2019-02-07 | End: 2019-02-07 | Stop reason: HOSPADM

## 2019-02-07 RX ORDER — ONDANSETRON 2 MG/ML
INJECTION INTRAMUSCULAR; INTRAVENOUS PRN
Status: DISCONTINUED | OUTPATIENT
Start: 2019-02-07 | End: 2019-02-07

## 2019-02-07 RX ORDER — HYDRALAZINE HYDROCHLORIDE 20 MG/ML
2.5-5 INJECTION INTRAMUSCULAR; INTRAVENOUS EVERY 10 MIN PRN
Status: DISCONTINUED | OUTPATIENT
Start: 2019-02-07 | End: 2019-02-07 | Stop reason: HOSPADM

## 2019-02-07 RX ORDER — PROPOFOL 10 MG/ML
INJECTION, EMULSION INTRAVENOUS PRN
Status: DISCONTINUED | OUTPATIENT
Start: 2019-02-07 | End: 2019-02-07

## 2019-02-07 RX ADMIN — PROPOFOL 200 MG: 10 INJECTION, EMULSION INTRAVENOUS at 15:32

## 2019-02-07 RX ADMIN — SODIUM CHLORIDE, POTASSIUM CHLORIDE, SODIUM LACTATE AND CALCIUM CHLORIDE: 600; 310; 30; 20 INJECTION, SOLUTION INTRAVENOUS at 15:24

## 2019-02-07 RX ADMIN — FENTANYL CITRATE 50 MCG: 50 INJECTION, SOLUTION INTRAMUSCULAR; INTRAVENOUS at 15:28

## 2019-02-07 RX ADMIN — DEXAMETHASONE SODIUM PHOSPHATE 4 MG: 4 INJECTION, SOLUTION INTRA-ARTICULAR; INTRALESIONAL; INTRAMUSCULAR; INTRAVENOUS; SOFT TISSUE at 15:32

## 2019-02-07 RX ADMIN — FENTANYL CITRATE 50 MCG: 50 INJECTION, SOLUTION INTRAMUSCULAR; INTRAVENOUS at 15:32

## 2019-02-07 RX ADMIN — LIDOCAINE HYDROCHLORIDE 50 MG: 10 INJECTION, SOLUTION EPIDURAL; INFILTRATION; INTRACAUDAL; PERINEURAL at 15:32

## 2019-02-07 RX ADMIN — CIPROFLOXACIN 400 MG: 2 INJECTION, SOLUTION INTRAVENOUS at 15:24

## 2019-02-07 RX ADMIN — ONDANSETRON 4 MG: 2 INJECTION INTRAMUSCULAR; INTRAVENOUS at 16:08

## 2019-02-07 RX ADMIN — MIDAZOLAM 2 MG: 1 INJECTION INTRAMUSCULAR; INTRAVENOUS at 15:24

## 2019-02-07 ASSESSMENT — MIFFLIN-ST. JEOR: SCORE: 1497.19

## 2019-02-07 ASSESSMENT — ENCOUNTER SYMPTOMS: DYSRHYTHMIAS: 0

## 2019-02-07 NOTE — ANESTHESIA POSTPROCEDURE EVALUATION
Patient: Bre GUTIERREZ Federico    Procedure(s):  LEFT EXTRACORPOREAL SHOCK WAVE LITHOTRIPSY    Diagnosis:LEFT KIDNEY STONE  Diagnosis Additional Information: Renal stones  [place]    Anesthesia Type:  General, LMA    Note:  Anesthesia Post Evaluation    Patient location during evaluation: PACU  Patient participation: Able to fully participate in evaluation  Level of consciousness: awake  Pain management: adequate  Airway patency: patent  Cardiovascular status: acceptable  Respiratory status: acceptable  Hydration status: acceptable  PONV: none     Anesthetic complications: None          Last vitals:  Vitals:    02/07/19 1625 02/07/19 1630 02/07/19 1635   BP: 125/79 123/77    Pulse: 85 81    Resp: 13 14 12   Temp:      SpO2: 100% 100% 100%         Electronically Signed By: Kenji Mcdowell MD  February 7, 2019  4:37 PM

## 2019-02-07 NOTE — ANESTHESIA CARE TRANSFER NOTE
Patient: Bre GUTIERREZ Federico    Procedure(s):  LEFT EXTRACORPOREAL SHOCK WAVE LITHOTRIPSY    Diagnosis: LEFT KIDNEY STONE  Diagnosis Additional Information: No value filed.    Anesthesia Type:   General, LMA     Note:  Airway :Face Mask  Patient transferred to:PACU  Handoff Report: Identifed the Patient, Identified the Reponsible Provider, Reviewed the pertinent medical history, Discussed the surgical course, Reviewed Intra-OP anesthesia mangement and issues during anesthesia, Set expectations for post-procedure period and Allowed opportunity for questions and acknowledgement of understanding      Vitals: (Last set prior to Anesthesia Care Transfer)    CRNA VITALS  2/7/2019 1548 - 2/7/2019 1623      2/7/2019             Resp Rate (observed):  4  (Abnormal)                 Electronically Signed By: Dean Dennis Severson, APRN CRNA  February 7, 2019  4:23 PM

## 2019-02-07 NOTE — BRIEF OP NOTE
Diagnosis: 1.2 cm proximal left ureteral calculus  Procedure: Left ESWL (3000 shocks)  Surgeon: Anna  Anesthesia: General laryngeal mask  EBL: No estimate  Findings: Stone treated with 3000 shocks and became lighter and more spread out on follow-up films

## 2019-02-07 NOTE — ANESTHESIA PREPROCEDURE EVALUATION
Anesthesia Pre-Procedure Evaluation    Patient: Bre Caballero   MRN: [id number] : 1965          Preoperative Diagnosis: LEFT KIDNEY STONE    Procedure(s):  LEFT EXTRACORPOREAL SHOCK WAVE LITHOTRIPSY    Past Medical History:   Diagnosis Date     E. coli sepsis (H) 2019     Fever 2019     Hypertension      Leukocytosis 2019     Past Surgical History:   Procedure Laterality Date     CHOLECYSTECTOMY       COMBINED CYSTOSCOPY, INSERT STENT URETER(S) Left 2019    Procedure: Video cystoscopy, left double-J stent placement (6 Pashto by 26 cm);  Surgeon: Terence Castillo MD;  Location: [facility] OR     Anesthesia Evaluation     . Pt has had prior anesthetic.            ROS/MED HX    ENT/Pulmonary:      (-) asthma, sleep apnea and Other pulmonary disease   Neurologic:      (-) TIA and Dementia   Cardiovascular:     (+) Dyslipidemia, hypertension----. : . . . :. .      (-) CAD, CHF, arrhythmias and pulmonary hypertension   METS/Exercise Tolerance:     Hematologic:        (-) anemia   Musculoskeletal:        (-) arthritis   GI/Hepatic:        (-) GERD and hepatitis   Renal/Genitourinary:     (+) chronic renal disease (Urosepsis),       Endo:     (+) Obesity, .   (-) Type I DM, Type II DM, thyroid disease, chronic steroid usage and other endocrine disorder   Psychiatric:        (-) psychiatric history   Infectious Disease:   (+) Recent Fever, Other Infectious Disease Urosepsis      Malignancy:      - no malignancy   Other:    - neg other ROS                      Physical Exam      Airway   Mallampati: II  TM distance: >3 FB  Neck ROM: full    Dental     Cardiovascular   Rhythm and rate: regular and normal      Pulmonary    breath sounds clear to auscultation            Lab Results   Component Value Date    WBC 14.0 (H) 2019    HGB 10.0 (L) 2019    HCT 30.7 (L) 2019     2019     2019    POTASSIUM 4.3 2019    CHLORIDE 107 2019    CO2 26 2019     "BUN 15 01/19/2019    CR 1.28 (H) 01/19/2019     (H) 01/19/2019    CHERI 8.1 (L) 01/19/2019    ALBUMIN 2.9 (L) 01/15/2019    PROTTOTAL 7.7 01/15/2019    ALT 30 01/15/2019    AST 31 01/15/2019    ALKPHOS 189 (H) 01/15/2019    BILITOTAL 0.6 01/15/2019    HCG Negative 02/07/2019    HCGS Negative 01/15/2019       Preop Vitals  BP Readings from Last 3 Encounters:   02/07/19 129/88   01/30/19 122/80   01/19/19 156/90    Pulse Readings from Last 3 Encounters:   01/30/19 112   01/17/19 100      Resp Readings from Last 3 Encounters:   02/07/19 16   01/19/19 18    SpO2 Readings from Last 3 Encounters:   02/07/19 96%   01/30/19 97%   01/19/19 96%      Temp Readings from Last 1 Encounters:   02/07/19 97.8  F (36.6  C) (Temporal)    Ht Readings from Last 1 Encounters:   02/07/19 1.676 m (5' 6\")      Wt Readings from Last 1 Encounters:   02/07/19 87.5 kg (193 lb)    Estimated body mass index is 31.15 kg/m  as calculated from the following:    Height as of this encounter: 1.676 m (5' 6\").    Weight as of this encounter: 87.5 kg (193 lb).       Anesthesia Plan      History & Physical Review  History and physical reviewed and following examination; no interval change.    ASA Status:  3 .    NPO Status:  > 8 hours    Plan for General and LMA with Intravenous and Propofol induction. Maintenance will be Balanced.    PONV prophylaxis:  Ondansetron (or other 5HT-3) and Dexamethasone or Solumedrol       Postoperative Care  Postoperative pain management:  IV analgesics, Oral pain medications and Multi-modal analgesia.      Consents  Anesthetic plan, risks, benefits and alternatives discussed with:  Patient..                 Phillip Cook MD                    .  "

## 2019-02-08 DIAGNOSIS — N20.0 CALCULUS OF KIDNEY: Primary | ICD-10-CM

## 2019-02-08 NOTE — OP NOTE
Procedure Date: 02/07/2019      PREOPERATIVE DIAGNOSIS:  A 1.2 cm proximal left ureteral calculus.      POSTOPERATIVE DIAGNOSIS:  A 1.2 cm proximal left ureteral calculus.      PROCEDURE:  Left ESWL (3000 shocks)      SURGEON:  Terence Castillo Jr, MD      ANESTHESIA:  General laryngeal mask.      ESTIMATED BLOOD LOSS:  No estimate.      INDICATIONS:  The patient is a 53-year-old female with her first kidney stone.  She has normal electrolytes, a creatinine of 1.28, and a normal serum calcium level.  Her urines have been concentrated and pH 5.5.  She underwent emergency left stent placement on 01/17 because of sepsis, and she was found to have a left pyonephrosis.  Her urine culture I do not see was done.  Her most recent urinalysis on 01/30 showed moderate red blood cells and no evidence of infection.  She now presents for left ESWL.  The procedure, the alternatives, risks, and followup were carefully discussed, and will plan to remove her stent in 2 weeks.        DETAILS OF PROCEDURE:  The patient was taken to the lithotripter suite and received 2 grams of IV Ancef.  She was placed supine on the table and administered a general laryngeal mask anesthetic.  Her stone was then digitized at the F2 focal point, and we started immediately at power level 6 with a slow rate followed by eventual ramping up to power level 7 and 8.  After 1500 shocks, on followup films there was some softening of the stone edges and some lightening of the stone.  Another 1500 shocks was given, and followup films showed no significant change in the size of the stone, but a lighter density on fluoroscopy, suggesting the stone had fragmented nicely.  The patient tolerated the procedure well and went to the recovery room in stable condition.  She will be discharged home tonight.  She will follow up with me in 2 weeks with a KUB and for stent removal.  She will strain her urine at home and bring fragments in to be sent for analysis, and I will have  her see [facility] for metabolic stone evaluation.         DRISS LEES JR, MD             D: 2019   T: 2019   MT: ARIE      Name:     IDALIA GAY   MRN:      [id number]        Account:        [id number]   :      1965           Procedure Date: 2019      Document: [id number]       cc: [facility]

## 2019-02-19 DIAGNOSIS — N20.1 CALCULUS OF URETER: Primary | ICD-10-CM

## 2019-02-20 ENCOUNTER — OFFICE VISIT (OUTPATIENT)
Dept: UROLOGY | Facility: CLINIC | Age: 54
End: 2019-02-20
Payer: COMMERCIAL

## 2019-02-20 ENCOUNTER — HOSPITAL ENCOUNTER (OUTPATIENT)
Dept: GENERAL RADIOLOGY | Facility: CLINIC | Age: 54
Discharge: HOME | End: 2019-02-20
Attending: UROLOGY | Admitting: UROLOGY
Payer: COMMERCIAL

## 2019-02-20 VITALS
SYSTOLIC BLOOD PRESSURE: 120 MMHG | OXYGEN SATURATION: 96 % | BODY MASS INDEX: 31.02 KG/M2 | HEIGHT: 66 IN | DIASTOLIC BLOOD PRESSURE: 80 MMHG | WEIGHT: 193 LBS | HEART RATE: 82 BPM

## 2019-02-20 DIAGNOSIS — N20.1 LEFT URETERAL CALCULUS: Primary | ICD-10-CM

## 2019-02-20 DIAGNOSIS — N20.0 CALCULUS OF KIDNEY: ICD-10-CM

## 2019-02-20 PROCEDURE — 74019 RADEX ABDOMEN 2 VIEWS: CPT

## 2019-02-20 PROCEDURE — 99024 POSTOP FOLLOW-UP VISIT: CPT | Performed by: UROLOGY

## 2019-02-20 RX ORDER — LISINOPRIL 10 MG/1
TABLET ORAL
Refills: 11 | COMMUNITY
Start: 2019-01-04

## 2019-02-20 ASSESSMENT — PAIN SCALES - GENERAL: PAINLEVEL: NO PAIN (0)

## 2019-02-20 ASSESSMENT — MIFFLIN-ST. JEOR: SCORE: 1497.19

## 2019-02-20 NOTE — PROGRESS NOTES
Bre Caballero is a 53-year-old female with her first stone.  She presented with urosepsis and left flank pain and had a left double-J stent placed.  She has a large stone in the proximal left ureter that was treated with lithotripsy 10 days ago.  She  received 3000 shocks to the stone.  Todays KUB shows lightening of the stone density but essentially the same stone shape in the proximal left ureter.  She is tolerating her stent well  Other past medical history, medications and allergies reviewed  Assessment: History of urosepsis, left proximal ureteral calculus- stone still present proximal left ureter-discussed options of observation with KUB again in 2 weeks, taking the stent out today-worry that she will end up in the ER with flank pain, scheduling left double-J stent removal and ureteroscopic stone extraction now.  Plan: She will see me in 2 weeks with KUB.  If fragments have passed that I will take her stent out in the office.  If not she will be n.p.o. and will do stent removal and stone extraction under general anesthesia the same day.  Her surgery for her knee replacement is 8 days later

## 2019-02-20 NOTE — NURSING NOTE
Chief Complaint   Patient presents with     Cystoscopy     pt is here for cystoscopy stent removal.    Lanette Recinos, WINSOME

## 2019-02-20 NOTE — LETTER
2/20/2019       RE: Bre Caballero  [address]  [place] [address]     Dear Colleague,    Thank you for referring your patient, Bre Caballero, to the [facility] UROLOGY [facility]IN[facility] [place] at [facility]. Please see a copy of my visit note below.    Bre Caballero is a 53-year-old female with her first stone.  She presented with urosepsis and left flank pain and had a left double-J stent placed.  She has a large stone in the proximal left ureter that was treated with lithotripsy 10 days ago.  She  received 3000 shocks to the stone.  Todays KUB shows lightening of the stone density but essentially the same stone shape in the proximal left ureter.  She is tolerating her stent well  Other past medical history, medications and allergies reviewed  Assessment: History of urosepsis, left proximal ureteral calculus- stone still present proximal left ureter-discussed options of observation with KUB again in 2 weeks, taking the stent out today-worry that she will end up in the ER with flank pain, scheduling left double-J stent removal and ureteroscopic stone extraction now.  Plan: She will see me in 2 weeks with KUB.  If fragments have passed that I will take her stent out in the office.  If not she will be n.p.o. and will do stent removal and stone extraction under general anesthesia the same day.  Her surgery for her knee replacement is 8 days later    Again, thank you for allowing me to participate in the care of your patient.      Sincerely,    Terence Castillo MD

## 2019-02-20 NOTE — LETTER
Date:February 21, 2019      Patient was self referred, no letter generated. Do not send.        [facility] Health Information

## 2019-02-26 RX ORDER — ASCORBIC ACID 500 MG
500 TABLET ORAL DAILY
COMMUNITY

## 2019-02-26 RX ORDER — FERROUS SULFATE 325(65) MG
325 TABLET ORAL
COMMUNITY

## 2019-03-05 ENCOUNTER — APPOINTMENT (OUTPATIENT)
Dept: GENERAL RADIOLOGY | Facility: CLINIC | Age: 54
End: 2019-03-05
Attending: UROLOGY
Payer: COMMERCIAL

## 2019-03-05 ENCOUNTER — ANESTHESIA EVENT (OUTPATIENT)
Dept: SURGERY | Facility: CLINIC | Age: 54
End: 2019-03-05
Payer: COMMERCIAL

## 2019-03-05 ENCOUNTER — ANESTHESIA (OUTPATIENT)
Dept: SURGERY | Facility: CLINIC | Age: 54
End: 2019-03-05
Payer: COMMERCIAL

## 2019-03-05 ENCOUNTER — HOSPITAL ENCOUNTER (OUTPATIENT)
Facility: CLINIC | Age: 54
Discharge: HOME | End: 2019-03-05
Attending: UROLOGY | Admitting: UROLOGY
Payer: COMMERCIAL

## 2019-03-05 VITALS
HEIGHT: 66 IN | RESPIRATION RATE: 14 BRPM | WEIGHT: 196 LBS | OXYGEN SATURATION: 100 % | SYSTOLIC BLOOD PRESSURE: 138 MMHG | BODY MASS INDEX: 31.5 KG/M2 | HEART RATE: 80 BPM | TEMPERATURE: 98.7 F | DIASTOLIC BLOOD PRESSURE: 84 MMHG

## 2019-03-05 DIAGNOSIS — N20.1 LEFT URETERAL CALCULUS: ICD-10-CM

## 2019-03-05 DIAGNOSIS — N20.1 CALCULUS OF PROXIMAL LEFT URETER: Primary | ICD-10-CM

## 2019-03-05 LAB
COPATH REPORT: NORMAL
HCG UR QL: NEGATIVE

## 2019-03-05 PROCEDURE — 25000128 H RX IP 250 OP 636

## 2019-03-05 PROCEDURE — 25500064 ZZH RX 255 OP 636: Performed by: UROLOGY

## 2019-03-05 PROCEDURE — 71000012 ZZH RECOVERY PHASE 1 LEVEL 1 FIRST HR: Performed by: UROLOGY

## 2019-03-05 PROCEDURE — 25800030 ZZH RX IP 258 OP 636: Performed by: ANESTHESIOLOGY

## 2019-03-05 PROCEDURE — 25800025 ZZH RX 258: Performed by: UROLOGY

## 2019-03-05 PROCEDURE — 52353 CYSTOURETERO W/LITHOTRIPSY: CPT | Mod: LT | Performed by: UROLOGY

## 2019-03-05 PROCEDURE — 88300 SURGICAL PATH GROSS: CPT | Performed by: UROLOGY

## 2019-03-05 PROCEDURE — 25000128 H RX IP 250 OP 636: Performed by: UROLOGY

## 2019-03-05 PROCEDURE — 25000125 ZZHC RX 250: Performed by: UROLOGY

## 2019-03-05 PROCEDURE — 27210794 ZZH OR GENERAL SUPPLY STERILE: Performed by: UROLOGY

## 2019-03-05 PROCEDURE — 82365 CALCULUS SPECTROSCOPY: CPT | Performed by: UROLOGY

## 2019-03-05 PROCEDURE — 25000125 ZZHC RX 250

## 2019-03-05 PROCEDURE — 71000027 ZZH RECOVERY PHASE 2 EACH 15 MINS: Performed by: UROLOGY

## 2019-03-05 PROCEDURE — 74019 RADEX ABDOMEN 2 VIEWS: CPT

## 2019-03-05 PROCEDURE — 88300 SURGICAL PATH GROSS: CPT | Mod: 26 | Performed by: UROLOGY

## 2019-03-05 PROCEDURE — 81025 URINE PREGNANCY TEST: CPT | Performed by: ANESTHESIOLOGY

## 2019-03-05 PROCEDURE — 40000306 ZZH STATISTIC PRE PROC ASSESS II: Performed by: UROLOGY

## 2019-03-05 PROCEDURE — 37000009 ZZH ANESTHESIA TECHNICAL FEE, EACH ADDTL 15 MIN: Performed by: UROLOGY

## 2019-03-05 PROCEDURE — 36000060 ZZH SURGERY LEVEL 3 W FLUORO 1ST 30 MIN: Performed by: UROLOGY

## 2019-03-05 PROCEDURE — 37000008 ZZH ANESTHESIA TECHNICAL FEE, 1ST 30 MIN: Performed by: UROLOGY

## 2019-03-05 PROCEDURE — C1769 GUIDE WIRE: HCPCS | Performed by: UROLOGY

## 2019-03-05 PROCEDURE — 36000058 ZZH SURGERY LEVEL 3 EA 15 ADDTL MIN: Performed by: UROLOGY

## 2019-03-05 PROCEDURE — 25000125 ZZHC RX 250: Performed by: ANESTHESIOLOGY

## 2019-03-05 PROCEDURE — 40000277 XR SURGERY CARM FLUORO LESS THAN 5 MIN W STILLS: Mod: TC

## 2019-03-05 RX ORDER — SODIUM CHLORIDE, SODIUM LACTATE, POTASSIUM CHLORIDE, CALCIUM CHLORIDE 600; 310; 30; 20 MG/100ML; MG/100ML; MG/100ML; MG/100ML
INJECTION, SOLUTION INTRAVENOUS CONTINUOUS
Status: DISCONTINUED | OUTPATIENT
Start: 2019-03-05 | End: 2019-03-05 | Stop reason: HOSPADM

## 2019-03-05 RX ORDER — ONDANSETRON 2 MG/ML
INJECTION INTRAMUSCULAR; INTRAVENOUS PRN
Status: DISCONTINUED | OUTPATIENT
Start: 2019-03-05 | End: 2019-03-05

## 2019-03-05 RX ORDER — CEFAZOLIN SODIUM 2 G/100ML
2 INJECTION, SOLUTION INTRAVENOUS
Status: COMPLETED | OUTPATIENT
Start: 2019-03-05 | End: 2019-03-05

## 2019-03-05 RX ORDER — IBUPROFEN 200 MG
200-400 TABLET ORAL EVERY 4 HOURS PRN
COMMUNITY

## 2019-03-05 RX ORDER — PROPOFOL 10 MG/ML
INJECTION, EMULSION INTRAVENOUS PRN
Status: DISCONTINUED | OUTPATIENT
Start: 2019-03-05 | End: 2019-03-05

## 2019-03-05 RX ORDER — FENTANYL CITRATE 50 UG/ML
INJECTION, SOLUTION INTRAMUSCULAR; INTRAVENOUS PRN
Status: DISCONTINUED | OUTPATIENT
Start: 2019-03-05 | End: 2019-03-05

## 2019-03-05 RX ORDER — SULFAMETHOXAZOLE/TRIMETHOPRIM 800-160 MG
1 TABLET ORAL 2 TIMES DAILY
Qty: 3 TABLET | Refills: 0 | Status: SHIPPED | OUTPATIENT
Start: 2019-03-05

## 2019-03-05 RX ORDER — LIDOCAINE 40 MG/G
CREAM TOPICAL
Status: DISCONTINUED | OUTPATIENT
Start: 2019-03-05 | End: 2019-03-05 | Stop reason: HOSPADM

## 2019-03-05 RX ORDER — CEFAZOLIN SODIUM 1 G/3ML
1 INJECTION, POWDER, FOR SOLUTION INTRAMUSCULAR; INTRAVENOUS SEE ADMIN INSTRUCTIONS
Status: DISCONTINUED | OUTPATIENT
Start: 2019-03-05 | End: 2019-03-05 | Stop reason: HOSPADM

## 2019-03-05 RX ORDER — DEXAMETHASONE SODIUM PHOSPHATE 4 MG/ML
INJECTION, SOLUTION INTRA-ARTICULAR; INTRALESIONAL; INTRAMUSCULAR; INTRAVENOUS; SOFT TISSUE PRN
Status: DISCONTINUED | OUTPATIENT
Start: 2019-03-05 | End: 2019-03-05

## 2019-03-05 RX ORDER — GLYCOPYRROLATE 0.2 MG/ML
INJECTION, SOLUTION INTRAMUSCULAR; INTRAVENOUS PRN
Status: DISCONTINUED | OUTPATIENT
Start: 2019-03-05 | End: 2019-03-05

## 2019-03-05 RX ADMIN — GLYCOPYRROLATE 0.2 MG: 0.2 INJECTION, SOLUTION INTRAMUSCULAR; INTRAVENOUS at 15:21

## 2019-03-05 RX ADMIN — DEXAMETHASONE SODIUM PHOSPHATE 4 MG: 4 INJECTION, SOLUTION INTRA-ARTICULAR; INTRALESIONAL; INTRAMUSCULAR; INTRAVENOUS; SOFT TISSUE at 15:14

## 2019-03-05 RX ADMIN — PROPOFOL 200 MG: 10 INJECTION, EMULSION INTRAVENOUS at 15:14

## 2019-03-05 RX ADMIN — CEFAZOLIN SODIUM 2 G: 2 INJECTION, SOLUTION INTRAVENOUS at 15:07

## 2019-03-05 RX ADMIN — ONDANSETRON 4 MG: 2 INJECTION INTRAMUSCULAR; INTRAVENOUS at 15:21

## 2019-03-05 RX ADMIN — FENTANYL CITRATE 100 MCG: 50 INJECTION, SOLUTION INTRAMUSCULAR; INTRAVENOUS at 15:14

## 2019-03-05 RX ADMIN — LIDOCAINE HYDROCHLORIDE 50 MG: 10 INJECTION, SOLUTION EPIDURAL; INFILTRATION; INTRACAUDAL; PERINEURAL at 15:14

## 2019-03-05 RX ADMIN — MIDAZOLAM 2 MG: 1 INJECTION INTRAMUSCULAR; INTRAVENOUS at 15:00

## 2019-03-05 RX ADMIN — SODIUM CHLORIDE, POTASSIUM CHLORIDE, SODIUM LACTATE AND CALCIUM CHLORIDE: 600; 310; 30; 20 INJECTION, SOLUTION INTRAVENOUS at 14:37

## 2019-03-05 ASSESSMENT — MIFFLIN-ST. JEOR: SCORE: 1510.55

## 2019-03-05 NOTE — BRIEF OP NOTE
Diagnosis: Large proximal left ureteral calculus, status post ESWL  Procedure: Video cystoscopy, left double-J stent removal, left ureteroscopy with holmium laser lithotripsy and stone extraction  Surgeon: Anna  Anesthesia: General laryngeal mask  Estimated blood loss: 0 ml  Findings: Stone had one shallow crack from lithotripsy.  Large stone and it required holmium laser lithotripsy.  Stone was very dense and hard to break out with laser

## 2019-03-05 NOTE — ANESTHESIA CARE TRANSFER NOTE
Patient: Bre GUTIERREZ Federico    Procedure(s):  Cystoscopy, left ureteroscopy, holmium laser lithotripsy and left stent removal    Diagnosis: Left stone  Diagnosis Additional Information: No value filed.    Anesthesia Type:   General     Note:  Airway :Face Mask  Patient transferred to:PACU  Comments: Pt to PACU, VSS, report to RNHandoff Report: Identifed the Patient, Identified the Reponsible Provider, Reviewed the pertinent medical history, Discussed the surgical course, Reviewed Intra-OP anesthesia mangement and issues during anesthesia, Set expectations for post-procedure period and Allowed opportunity for questions and acknowledgement of understanding      Vitals: (Last set prior to Anesthesia Care Transfer)    CRNA VITALS  3/5/2019 1515 - 3/5/2019 1554      3/5/2019             Resp Rate (observed):  1  (Abnormal)                 Electronically Signed By: ABHIJEET Toribio CRNA  March 5, 2019  3:54 PM

## 2019-03-05 NOTE — DISCHARGE INSTRUCTIONS
GENERAL ANESTHESIA OR SEDATION ADULT DISCHARGE INSTRUCTIONS   SPECIAL PRECAUTIONS FOR 24 HOURS AFTER SURGERY    IT IS NOT UNUSUAL TO FEEL LIGHT-HEADED OR FAINT, UP TO 24 HOURS AFTER SURGERY OR WHILE TAKING PAIN MEDICATION.  IF YOU HAVE THESE SYMPTOMS; SIT FOR A FEW MINUTES BEFORE STANDING AND HAVE SOMEONE ASSIST YOU WHEN YOU GET UP TO WALK OR USE THE BATHROOM.    YOU SHOULD REST AND RELAX FOR THE NEXT 24 HOURS AND YOU MUST MAKE ARRANGEMENTS TO HAVE SOMEONE STAY WITH YOU FOR AT LEAST 24 HOURS AFTER YOUR DISCHARGE.  AVOID HAZARDOUS AND STRENUOUS ACTIVITIES.  DO NOT MAKE IMPORTANT DECISIONS FOR 24 HOURS.    DO NOT DRIVE ANY VEHICLE OR OPERATE MECHANICAL EQUIPMENT FOR 24 HOURS FOLLOWING THE END OF YOUR SURGERY.  EVEN THOUGH YOU MAY FEEL NORMAL, YOUR REACTIONS MAY BE AFFECTED BY THE MEDICATION YOU HAVE RECEIVED.    DO NOT DRINK ALCOHOLIC BEVERAGES FOR 24 HOURS FOLLOWING YOUR SURGERY.    DRINK CLEAR LIQUIDS (APPLE JUICE, GINGER ALE, 7-UP, BROTH, ETC.).  PROGRESS TO YOUR REGULAR DIET AS YOU FEEL ABLE.    YOU MAY HAVE A DRY MOUTH, A SORE THROAT, MUSCLES ACHES OR TROUBLE SLEEPING.  THESE SHOULD GO AWAY AFTER 24 HOURS.    CALL YOUR DOCTOR FOR ANY OF THE FOLLOWING:  SIGNS OF INFECTION (FEVER, GROWING TENDERNESS AT THE SURGERY SITE, A LARGE AMOUNT OF DRAINAGE OR BLEEDING, SEVERE PAIN, FOUL-SMELLING DRAINAGE, REDNESS OR SWELLING.    IT HAS BEEN OVER 8 TO 10 HOURS SINCE SURGERY AND YOU ARE STILL NOT ABLE TO URINATE (PASS WATER).     CYSTOSCOPY DISCHARGE INSTRUCTIONS  [facility]ly [facility], [facility] / UROLOGY  [facility]  [phone]    YOU MAY GO BACK TO YOUR NORMAL DIET AND ACTIVITY, UNLESS YOUR DOCTOR TELLS YOU NOT TO.    FOR THE NEXT TWO DAYS, YOU MAY NOTICE:    SOME BLOOD IN YOUR URINE.  SOME BURNING WHEN YOU URINATE (USE THE TOILET).  AN URGE TO URINATE MORE OFTEN.  BLADDER SPASMS.    THESE ARE NORMAL AFTER THE PROCEDURE.  THEY SHOULD GO AWAY AFTER A DAY OR TWO.  TO RELIEVE THESE PROBLEMS:     DRINK 6 TO 8 LARGE  GLASSES OF WATER EACH DAY (INCLUDES DRINKS AT MEALS).  THIS WILL HELP CLEAR THE URINE.    TAKE WARM BATHS TO RELIEVE PAIN AND BLADDER SPASMS.  DO NOT ADD ANYTHING TO THE BATH WATER.    YOUR DOCTOR MAY PRESCRIBE PAIN MEDICINE.  YOU MAY ALSO TAKE TYLENOL (ACETAMINOPHEN) FOR PAIN.    CALL YOUR SURGEON IF YOU HAVE:    A FEVER OVER 101 DEGREES.  CHECK YOUR TEMPERATURE UNDER YOUR TONGUE.    CHILLS.    FAILURE TO URINATE (NO URINE COMES OUT WHEN YOU TRY TO USE THE TOILET).  TRY SOAKING IN A BATHTUB FULL OF WARM WATER.  IF STILL NO URINE, CALL YOUR DOCTOR.    A LOT OF BLOOD IN THE URINE, OR BLOOD CLOTS LARGER THAN A NICKEL.      PAIN IN THE BACK OR BELLY AREA (ABDOMEN).    PAIN OR SPASMS THAT ARE NOT RELIEVED BY WARM TUB BATHS AND PAIN MEDICINE.      SEVERE PAIN, BURNING OR OTHER PROBLEMS WHILE PASSING URINE.    PAIN THAT GETS WORSE AFTER TWO DAYS.

## 2019-03-05 NOTE — OP NOTE
Procedure Date: 03/05/2019      PREOPERATIVE DIAGNOSIS:  Large proximal left ureteral calculus.      POSTOPERATIVE DIAGNOSIS:  Large proximal left ureteral calculus.      PROCEDURE:  Video cystoscopy, left double-J stent removal, left ureteroscopy with holmium laser lithotripsy and stone extraction.      SURGEON:  Terence Castillo MD      ANESTHESIA:  General laryngeal mask.      ESTIMATED BLOOD LOSS:  0 mL.      INDICATIONS:  The patient is a 53-year-old female who underwent lithotripsy of a 1.2 cm proximal left ureteral calculus on 02/07.  She received 3000 shocks to the stone and on repeated films, her stone has not changed in position or configuration.  She now presents for ureteroscopy with stone extraction.  The procedure, alternatives, risks and follow-up were carefully discussed with her and her .      DESCRIPTION OF THE PROCEDURE:  The patient received 2 grams of IV Ancef and was taken to cystoscopy.  She was placed supine on the operating table and administered a general laryngeal mask anesthetic.  She was then placed in lithotomy position and her genitalia were prepped and draped in a sterile fashion.  A #20 [place] Islander Storz cystoscope with obturator was gently introduced into the bladder and residual urine was clear.  The 30-degree lens and video were used to visualize the bladder and urethra with water as an irrigant.  The urethra and bladder mucosa were normal and no stones were seen.  Her left double J stent was grasped with the cup biopsy forceps and brought out to the urethral meatus.  A Glidewire was passed up the stent under fluoroscopy until the wire curled in the region of the left renal pelvis above her stone.  I then removed the stent and stabilized the Glidewire.  I then passed a long, 6.9 Pit[place] Islander ureteroscope into the bladder and up the ureter easily until her stone was seen.  There was a crack in the ureter, but no fragmentation.  Using the 365 micron holmium laser fiber at 4 gillis, I  broke the stone into 2 pieces eventually.  The laser fiber was removed and I basketed the 2 pieces with a nitinol basket easily and sent these for stone analysis.  I then repassed the ureteroscope up to the urea and saw no stone fragments, bleeding or perforation.  I brought the scope down to the distal ureter and shot some dye through the ureteroscope and the ureterogram showed no extravasation of dye.  The ureteroscope was removed.  The bladder was emptied with the cystoscope sheath.  The patient tolerated the procedure well and went to the recovery room in stable condition and will be discharged on Septra-DS 1 every 12 hours for 3 doses.  She will follow up with me in 3-4 weeks to review her stone analysis.         DRISS LEES JR, MD             D: 2019   T: 2019   MT: JARROD      Name:     IDALIA GAY   MRN:      [id number]        Account:        [id number]   :      1965           Procedure Date: 2019      Document: [id number]       cc: Alysha Lese Jr, MD

## 2019-03-05 NOTE — ANESTHESIA PREPROCEDURE EVALUATION
Anesthesia Pre-Procedure Evaluation    Patient: Bre Caballero   MRN: [id number] : 1965          Preoperative Diagnosis: Left stone    Procedure(s):  Cystoscopy, left ureteroscopy, possible holmium laser lithotripsy and left stent removal    Past Medical History:   Diagnosis Date     Arthritis     in left knee, replacement scheduled 3/13/209     E. coli sepsis (H) 2019     Fever 2019     Hypertension      Leukocytosis 2019     Renal disease      Past Surgical History:   Procedure Laterality Date     CHOLECYSTECTOMY       COMBINED CYSTOSCOPY, INSERT STENT URETER(S) Left 2019    Procedure: Video cystoscopy, left double-J stent placement (6 Kazakh by 26 cm);  Surgeon: Terence Castillo MD;  Location: [facility] OR     EXTRACORPOREAL SHOCK WAVE LITHOTRIPSY (ESWL) Left 2019    Procedure: LEFT EXTRACORPOREAL SHOCK WAVE LITHOTRIPSY;  Surgeon: Terence Castillo MD;  Location:  OR     Anesthesia Evaluation     . Pt has had prior anesthetic. Type: General    No history of anesthetic complications          ROS/MED HX    ENT/Pulmonary:  - neg pulmonary ROS     Neurologic:  - neg neurologic ROS     Cardiovascular:     (+) hypertension----. : . . . :. .       METS/Exercise Tolerance:     Hematologic:  - neg hematologic  ROS       Musculoskeletal:  - neg musculoskeletal ROS       GI/Hepatic:  - neg GI/hepatic ROS       Renal/Genitourinary:     (+) chronic renal disease, type: ARF, Nephrolithiasis ,       Endo:     (+) Obesity, .      Psychiatric:  - neg psychiatric ROS       Infectious Disease:  - neg infectious disease ROS       Malignancy:         Other:    - neg other ROS                      Physical Exam  Normal systems: cardiovascular, pulmonary and dental    Airway   Mallampati: II  TM distance: >3 FB  Neck ROM: full    Dental     Cardiovascular       Pulmonary             Lab Results   Component Value Date    WBC 14.0 (H) 2019    HGB 10.0 (L) 2019    HCT 30.7 (L) 2019    PLT  "193 01/18/2019     01/19/2019    POTASSIUM 4.3 01/19/2019    CHLORIDE 107 01/19/2019    CO2 26 01/19/2019    BUN 15 01/19/2019    CR 1.28 (H) 01/19/2019     (H) 01/19/2019    CHERI 8.1 (L) 01/19/2019    ALBUMIN 2.9 (L) 01/15/2019    PROTTOTAL 7.7 01/15/2019    ALT 30 01/15/2019    AST 31 01/15/2019    ALKPHOS 189 (H) 01/15/2019    BILITOTAL 0.6 01/15/2019    HCG Negative 03/05/2019    HCGS Negative 01/15/2019       Preop Vitals  BP Readings from Last 3 Encounters:   03/05/19 145/85   02/20/19 120/80   02/07/19 (!) 141/91    Pulse Readings from Last 3 Encounters:   02/20/19 82   02/07/19 83   01/30/19 112      Resp Readings from Last 3 Encounters:   03/05/19 15   02/07/19 16   01/19/19 18    SpO2 Readings from Last 3 Encounters:   03/05/19 100%   02/20/19 96%   02/07/19 98%      Temp Readings from Last 1 Encounters:   03/05/19 98.4  F (36.9  C) (Temporal)    Ht Readings from Last 1 Encounters:   03/05/19 1.676 m (5' 5.98\")      Wt Readings from Last 1 Encounters:   03/05/19 88.9 kg (196 lb)    Estimated body mass index is 31.65 kg/m  as calculated from the following:    Height as of this encounter: 1.676 m (5' 5.98\").    Weight as of this encounter: 88.9 kg (196 lb).       Anesthesia Plan      History & Physical Review  History and physical reviewed and following examination; no interval change.    ASA Status:  2 .    NPO Status:  > 8 hours    Plan for General with Intravenous induction. Maintenance will be Balanced.    PONV prophylaxis:  Ondansetron (or other 5HT-3) and Dexamethasone or Solumedrol       Postoperative Care  Postoperative pain management:  Oral pain medications and IV analgesics.      Consents  Anesthetic plan, risks, benefits and alternatives discussed with:  Patient..                 Jakob Calhoun MD                    .  "

## 2019-03-05 NOTE — ANESTHESIA POSTPROCEDURE EVALUATION
Patient: Bre GUTIERREZ Federico    Procedure(s):  Cystoscopy, left ureteroscopy, holmium laser lithotripsy and left stent removal    Diagnosis:Left stone  Diagnosis Additional Information: Unsigned Transcription                 Hide copied text    Hover for details      Procedure Date: 03/05/2019      PREOPERATIVE DIAGNOSIS:  Large proximal left ureteral calculus.      POSTOPERATIVE DIAGNOSIS:  Large proximal left ureteral calculus.   ,    PROCEDURE:  Video cystoscopy, left double-J stent removal, left ureteroscopy with holmium laser lithotripsy and stone extraction.                 Anesthesia Type:  General    Note:  Anesthesia Post Evaluation    Patient location during evaluation: Phase 2  Patient participation: Able to fully participate in evaluation  Level of consciousness: awake  Pain management: adequate  Airway patency: patent  Cardiovascular status: acceptable  Respiratory status: acceptable  Hydration status: euvolemic  PONV: controlled     Anesthetic complications: None          Last vitals:  Vitals:    03/05/19 1600 03/05/19 1615 03/05/19 1635   BP: 124/73 117/84 131/81   Pulse: 80 87 80   Resp: 10 12 12   Temp:   98.7  F (37.1  C)   SpO2: 100% 98% 99%         Electronically Signed By: Bear Johnson MD  March 5, 2019  5:09 PM

## 2019-03-09 LAB
APPEARANCE STONE: NORMAL
COMPN STONE: NORMAL
NUMBER STONE: 2
SIZE STONE: NORMAL MM
WT STONE: 170 MG

## 2019-03-29 ENCOUNTER — OFFICE VISIT (OUTPATIENT)
Dept: UROLOGY | Facility: CLINIC | Age: 54
End: 2019-03-29
Payer: COMMERCIAL

## 2019-03-29 VITALS
HEART RATE: 99 BPM | OXYGEN SATURATION: 98 % | WEIGHT: 195 LBS | BODY MASS INDEX: 31.34 KG/M2 | SYSTOLIC BLOOD PRESSURE: 140 MMHG | HEIGHT: 66 IN | DIASTOLIC BLOOD PRESSURE: 98 MMHG

## 2019-03-29 DIAGNOSIS — Z87.442 PERSONAL HISTORY OF URINARY CALCULI: ICD-10-CM

## 2019-03-29 DIAGNOSIS — Z87.442 PERSONAL HISTORY OF URINARY CALCULI: Primary | ICD-10-CM

## 2019-03-29 LAB
ALBUMIN UR-MCNC: NEGATIVE MG/DL
APPEARANCE UR: CLEAR
BILIRUB UR QL STRIP: NEGATIVE
COLOR UR AUTO: YELLOW
GLUCOSE UR STRIP-MCNC: NEGATIVE MG/DL
HGB UR QL STRIP: NEGATIVE
KETONES UR STRIP-MCNC: NEGATIVE MG/DL
LEUKOCYTE ESTERASE UR QL STRIP: NEGATIVE
NITRATE UR QL: NEGATIVE
PH UR STRIP: 5 PH (ref 5–7)
SOURCE: NORMAL
SP GR UR STRIP: 1.02 (ref 1–1.03)
UROBILINOGEN UR STRIP-ACNC: 0.2 EU/DL (ref 0.2–1)

## 2019-03-29 PROCEDURE — 99212 OFFICE O/P EST SF 10 MIN: CPT | Performed by: UROLOGY

## 2019-03-29 PROCEDURE — 81003 URINALYSIS AUTO W/O SCOPE: CPT | Performed by: UROLOGY

## 2019-03-29 ASSESSMENT — MIFFLIN-ST. JEOR: SCORE: 1506.26

## 2019-03-29 ASSESSMENT — PAIN SCALES - GENERAL: PAINLEVEL: NO PAIN (0)

## 2019-03-29 NOTE — NURSING NOTE
Chief Complaint   Patient presents with     Clinic Care Coordination - Follow-up     Pt here for follow-up after stone     Amelia Bear CMA

## 2019-03-29 NOTE — LETTER
RE: Bre Caballero  [address] [facility] [place] [address]     Dear Colleague,    Thank you for referring your patient, Bre Caballero, to the [facility]OLOG[facility] [place] at [facility]. Please see a copy of my visit note below.    Fina is a 53-year-old female with her first stone.  She underwent lithotripsy for a large proximal ureteral stone on the left side.  Lithotripsy did not fragment the stone and she required ureteroscopy with holmium laser lithotripsy and stone extraction.  Urinalysis shows calcium oxalate monohydrate and 30% calcium phosphate.  She has normal serum calcium levels.  Other past medical history, medications and allergies were reviewed  Exam: Alert and oriented, with spouse.  Assessment: Calcium oxalate monohydrate stone explains why this was difficult to fragment.  Discussed 2 options for follow-up-1, see nephrology for metabolic stone  evaluation or 2, drink 2 L of water daily minimum, avoid spinach and almonds in her diet which she was eating regularly, avoid added salt in the diet and see me in 6 months with KUB    Again, thank you for allowing me to participate in the care of your patient.      Sincerely,    Terence Castillo MD

## 2019-03-29 NOTE — PROGRESS NOTES
Fina is a 53-year-old female with her first stone.  She underwent lithotripsy for a large proximal ureteral stone on the left side.  Lithotripsy did not fragment the stone and she required ureteroscopy with holmium laser lithotripsy and stone extraction.  Urinalysis shows calcium oxalate monohydrate and 30% calcium phosphate.  She has normal serum calcium levels.  Other past medical history, medications and allergies were reviewed  Exam: Alert and oriented, with spouse.  Assessment: Calcium oxalate monohydrate stone explains why this was difficult to fragment.  Discussed 2 options for follow-up-1, see nephrology for metabolic stone  evaluation or 2, drink 2 L of water daily minimum, avoid spinach and almonds in her diet which she was eating regularly, avoid added salt in the diet and see me in 6 months with KATERYNA

## 2019-04-18 ENCOUNTER — HOSPITAL ENCOUNTER (OUTPATIENT)
Dept: ULTRASOUND IMAGING | Facility: CLINIC | Age: 54
Discharge: HOME | End: 2019-04-18
Attending: ORTHOPAEDIC SURGERY | Admitting: ORTHOPAEDIC SURGERY
Payer: COMMERCIAL

## 2019-04-18 DIAGNOSIS — M79.605 PAIN OF LEFT LOWER EXTREMITY: ICD-10-CM

## 2019-04-18 PROCEDURE — 93971 EXTREMITY STUDY: CPT | Mod: LT

## 2019-09-20 DIAGNOSIS — N20.0 CALCULUS OF KIDNEY: Primary | ICD-10-CM

## 2019-09-25 ENCOUNTER — OFFICE VISIT (OUTPATIENT)
Dept: UROLOGY | Facility: CLINIC | Age: 54
End: 2019-09-25
Payer: COMMERCIAL

## 2019-09-25 ENCOUNTER — HOSPITAL ENCOUNTER (OUTPATIENT)
Dept: GENERAL RADIOLOGY | Facility: CLINIC | Age: 54
Discharge: HOME | End: 2019-09-25
Attending: UROLOGY | Admitting: UROLOGY
Payer: COMMERCIAL

## 2019-09-25 VITALS
HEART RATE: 92 BPM | BODY MASS INDEX: 30.67 KG/M2 | OXYGEN SATURATION: 92 % | DIASTOLIC BLOOD PRESSURE: 28 MMHG | SYSTOLIC BLOOD PRESSURE: 130 MMHG | WEIGHT: 190 LBS

## 2019-09-25 DIAGNOSIS — N20.0 CALCULUS OF KIDNEY: ICD-10-CM

## 2019-09-25 DIAGNOSIS — Z87.442 PERSONAL HISTORY OF URINARY CALCULI: ICD-10-CM

## 2019-09-25 LAB
ALBUMIN UR-MCNC: NEGATIVE MG/DL
APPEARANCE UR: CLEAR
BILIRUB UR QL STRIP: NEGATIVE
COLOR UR AUTO: YELLOW
GLUCOSE UR STRIP-MCNC: NEGATIVE MG/DL
HGB UR QL STRIP: ABNORMAL
KETONES UR STRIP-MCNC: NEGATIVE MG/DL
LEUKOCYTE ESTERASE UR QL STRIP: NEGATIVE
NITRATE UR QL: NEGATIVE
PH UR STRIP: 5 PH (ref 5–7)
SOURCE: ABNORMAL
SP GR UR STRIP: 1.02 (ref 1–1.03)
UROBILINOGEN UR STRIP-ACNC: 0.2 EU/DL (ref 0.2–1)

## 2019-09-25 PROCEDURE — 74019 RADEX ABDOMEN 2 VIEWS: CPT

## 2019-09-25 PROCEDURE — 99212 OFFICE O/P EST SF 10 MIN: CPT | Performed by: UROLOGY

## 2019-09-25 PROCEDURE — 81003 URINALYSIS AUTO W/O SCOPE: CPT | Performed by: UROLOGY

## 2019-09-25 NOTE — LETTER
9/25/2019       RE: Bre Caballero  [address] [place] [address]     Dear Colleague,    Thank you for referring your patient, Bre Caballero, to the [facility]OLOG[facility] [place] at [facility]. Please see a copy of my visit note below.    Bre is a 54-year-old female with a history of calcium oxalate monohydrate stones.  She has been drinking 2 to 3 L of water daily, avoiding almonds, salt and spinach in her diet.  She has had no flank pain or hematuria.  She has no other complaints.  Her KUB shows no stones.  Urinalysis: Specific gravity 1.020  Other past medical history: Arthritis, E. coli sepsis, hypertension, cholecystectomy, ESWL, ureteroscopy, non-smoker  Medications: Lisinopril, vitamin C  Allergies: None  Review of systems: As above  Exam: Alert and oriented, normal appearance, normal vital signs.  Normal respirations, neuro grossly intact  Assessment: No recurrent urolithiasis  Plan: Continue diet as above.  Be sure to drink more than 2 L of water daily.  See me in 12 months with KUB.  If no stones, no further follow-up    Again, thank you for allowing me to participate in the care of your patient.      Sincerely,    Terence Castillo MD

## 2019-09-25 NOTE — PROGRESS NOTES
Bre is a 54-year-old female with a history of calcium oxalate monohydrate stones.  She has been drinking 2 to 3 L of water daily, avoiding almonds, salt and spinach in her diet.  She has had no flank pain or hematuria.  She has no other complaints.  Her KUB shows no stones.  Urinalysis: Specific gravity 1.020  Other past medical history: Arthritis, E. coli sepsis, hypertension, cholecystectomy, ESWL, ureteroscopy, non-smoker  Medications: Lisinopril, vitamin C  Allergies: None  Review of systems: As above  Exam: Alert and oriented, normal appearance, normal vital signs.  Normal respirations, neuro grossly intact  Assessment: No recurrent urolithiasis  Plan: Continue diet as above.  Be sure to drink more than 2 L of water daily.  See me in 12 months with KUB.  If no stones, no further follow-up

## 2020-06-17 NOTE — DISCHARGE INSTRUCTIONS
Spoke to pt. Reviewed meds, sending instructions thru My chart   GENERAL ANESTHESIA OR SEDATION ADULT DISCHARGE INSTRUCTIONS   SPECIAL PRECAUTIONS FOR 24 HOURS AFTER SURGERY    IT IS NOT UNUSUAL TO FEEL LIGHT-HEADED OR FAINT, UP TO 24 HOURS AFTER SURGERY OR WHILE TAKING PAIN MEDICATION.  IF YOU HAVE THESE SYMPTOMS; SIT FOR A FEW MINUTES BEFORE STANDING AND HAVE SOMEONE ASSIST YOU WHEN YOU GET UP TO WALK OR USE THE BATHROOM.    YOU SHOULD REST AND RELAX FOR THE NEXT 24 HOURS AND YOU MUST MAKE ARRANGEMENTS TO HAVE SOMEONE STAY WITH YOU FOR AT LEAST 24 HOURS AFTER YOUR DISCHARGE.  AVOID HAZARDOUS AND STRENUOUS ACTIVITIES.  DO NOT MAKE IMPORTANT DECISIONS FOR 24 HOURS.    DO NOT DRIVE ANY VEHICLE OR OPERATE MECHANICAL EQUIPMENT FOR 24 HOURS FOLLOWING THE END OF YOUR SURGERY.  EVEN THOUGH YOU MAY FEEL NORMAL, YOUR REACTIONS MAY BE AFFECTED BY THE MEDICATION YOU HAVE RECEIVED.    DO NOT DRINK ALCOHOLIC BEVERAGES FOR 24 HOURS FOLLOWING YOUR SURGERY.    DRINK CLEAR LIQUIDS (APPLE JUICE, GINGER ALE, 7-UP, BROTH, ETC.).  PROGRESS TO YOUR REGULAR DIET AS YOU FEEL ABLE.    YOU MAY HAVE A DRY MOUTH, A SORE THROAT, MUSCLES ACHES OR TROUBLE SLEEPING.  THESE SHOULD GO AWAY AFTER 24 HOURS.    CALL YOUR DOCTOR FOR ANY OF THE FOLLOWING:  SIGNS OF INFECTION (FEVER, GROWING TENDERNESS AT THE SURGERY SITE, A LARGE AMOUNT OF DRAINAGE OR BLEEDING, SEVERE PAIN, FOUL-SMELLING DRAINAGE, REDNESS OR SWELLING.    IT HAS BEEN OVER 8 TO 10 HOURS SINCE SURGERY AND YOU ARE STILL NOT ABLE TO URINATE (PASS WATER).         DR. DRISS LEES M.D. CLINIC PHONE NUMBER:  [phone]      EXTRACORPOREAL SHOCK LITHOTRIPSY (ESWL) DISCHARGE INSTRUCTIONS  [facility]  [phone]      Your stone(s) has been fragmented into many tiny pieces, which must now pass in your urine.  Usually this process is uneventful.  Most fragments pass in the first one or two weeks, but some may continue to pass for three months or more.  Some pain or discomfort may accompany the passage of these fragments.    To  aid in the passage of fragments, drink lots of fluid.  Aim for 2 liters of water daily for the next one to two weeks. Most patients will benefit from a continued high fluid intake and urine output indefinitely, even after the fragments are gone.  This helps prevent new stone formation.    Strain your urine.  Take the stone fragments to your urologist.  They will have them analyzed to help determine the cause of your stone(s).    You should walk around and resume every day activities.  Activity may help the stone fragments pass.  You should, however, avoid sports or really strenuous exercise for about one week, or at least until there is no more blood in your urine.    You may resume regular diet.    Call your urologist if you have:  a. Persistent severe pain not relieved by oral medications.  b. Fever (over 101 ).  c. Persistent vomiting.    See your urologist as directed.  They will need to take an x-ray to check your progress.  Take your stone fragments to your follow-up appointment.

## 2020-10-05 DIAGNOSIS — Z87.442 PERSONAL HISTORY OF URINARY CALCULI: Primary | ICD-10-CM

## 2020-10-08 ENCOUNTER — OFFICE VISIT (OUTPATIENT)
Dept: UROLOGY | Facility: CLINIC | Age: 55
End: 2020-10-08
Payer: COMMERCIAL

## 2020-10-08 ENCOUNTER — HOSPITAL ENCOUNTER (OUTPATIENT)
Dept: GENERAL RADIOLOGY | Facility: CLINIC | Age: 55
Discharge: HOME | End: 2020-10-08
Attending: UROLOGY | Admitting: UROLOGY
Payer: COMMERCIAL

## 2020-10-08 VITALS
HEIGHT: 66 IN | BODY MASS INDEX: 32.14 KG/M2 | SYSTOLIC BLOOD PRESSURE: 150 MMHG | DIASTOLIC BLOOD PRESSURE: 90 MMHG | WEIGHT: 200 LBS | OXYGEN SATURATION: 98 % | HEART RATE: 80 BPM

## 2020-10-08 DIAGNOSIS — Z87.442 PERSONAL HISTORY OF URINARY CALCULI: Primary | ICD-10-CM

## 2020-10-08 DIAGNOSIS — Z87.442 PERSONAL HISTORY OF URINARY CALCULI: ICD-10-CM

## 2020-10-08 PROCEDURE — 81003 URINALYSIS AUTO W/O SCOPE: CPT | Mod: QW | Performed by: UROLOGY

## 2020-10-08 PROCEDURE — 74019 RADEX ABDOMEN 2 VIEWS: CPT

## 2020-10-08 PROCEDURE — 99212 OFFICE O/P EST SF 10 MIN: CPT | Performed by: UROLOGY

## 2020-10-08 RX ORDER — LOSARTAN POTASSIUM 25 MG/1
25 TABLET ORAL DAILY
COMMUNITY

## 2020-10-08 ASSESSMENT — MIFFLIN-ST. JEOR: SCORE: 1518.94

## 2020-10-08 ASSESSMENT — PAIN SCALES - GENERAL: PAINLEVEL: NO PAIN (0)

## 2020-10-08 NOTE — PROGRESS NOTES
Bre is a 55-year-old female with a history of calcium oxalate/calcium phosphate urolithiasis.  She has a normal urinalysis this morning but a high specific gravity.  We discussed dietary recommendations, stressing drinking at least 2 L of water daily and starting early in the morning.  She is avoiding salt and foods high acid and oxalate.  She has had no flank pain and passed no stones in the last year.  Her KUB shows no remaining calcifications  Other past medical history: Arthritis, history of E. coli sepsis, hypertension, surgeries reviewed.  Non-smoker  Medications: Cozaar, vitamin D  Allergies: None  Review of systems: As above  Exam: Alert and oriented, normal appearance, normal vital signs.  Normal respirations, neuro grossly intact  Assessment: Maintain high water, low-salt and low oxalate diet.  Also discussed urinary citrate.  She would like to follow-up in another year with a KUB since she had no symptoms with her last stone  Plan: See with KUB 12 months

## 2020-10-08 NOTE — NURSING NOTE
Chief Complaint   Patient presents with     Hx Kidney Stones     Review KUB     Zaida Darling, EMT

## 2020-10-08 NOTE — LETTER
10/8/2020       RE: Bre Caballero  [address] [place] [address]     Dear Colleague,    Thank you for referring your patient, Bre Caballero, to the [facility] UROLOGY CL[facility] [place] at [facility]. Please see a copy of my visit note below.    Bre is a 55-year-old female with a history of calcium oxalate/calcium phosphate urolithiasis.  She has a normal urinalysis this morning but a high specific gravity.  We discussed dietary recommendations, stressing drinking at least 2 L of water daily and starting early in the morning.  She is avoiding salt and foods high acid and oxalate.  She has had no flank pain and passed no stones in the last year.  Her KUB shows no remaining calcifications  Other past medical history: Arthritis, history of E. coli sepsis, hypertension, surgeries reviewed.  Non-smoker  Medications: Cozaar, vitamin D  Allergies: None  Review of systems: As above  Exam: Alert and oriented, normal appearance, normal vital signs.  Normal respirations, neuro grossly intact  Assessment: Maintain high water, low-salt and low oxalate diet.  Also discussed urinary citrate.  She would like to follow-up in another year with a KUB since she had no symptoms with her last stone  Plan: See with KUB 12 months      Again, thank you for allowing me to participate in the care of your patient.      Sincerely,    Terence Castillo MD

## (undated) DEVICE — PACK CYSTO CUSTOM RIDGES

## (undated) DEVICE — LASER FIBER HOLMIUM 365UM HTB-365

## (undated) DEVICE — LINEN TOWEL PACK X5 5464

## (undated) DEVICE — LINEN HALF SHEET 5512

## (undated) DEVICE — TUBING IRRIG TUR Y TYPE 96" LF 6543-01

## (undated) DEVICE — BAG CLEAR TRASH 1.3M 39X33" P4040C

## (undated) DEVICE — CATH URETERAL FLEX TIP TIGERTAIL 06FRX70CM 139006

## (undated) DEVICE — PAD CHUX UNDERPAD 30X36" P3036C

## (undated) DEVICE — SOL WATER IRRIG 3000ML BAG 2B7117

## (undated) DEVICE — PREP TECHNI-CARE CHLOROXYLENOL 3% 4OZ BOTTLE C222-4ZWO

## (undated) DEVICE — COVER FOOTSWITCH W/CINCH 20X24" 923267

## (undated) DEVICE — GLOVE PROTEXIS POWDER FREE SMT 7.5  2D72PT75X

## (undated) DEVICE — LINEN FULL SHEET 5511

## (undated) DEVICE — SOL WATER IRRIG 1000ML BOTTLE 2F7114

## (undated) DEVICE — GUIDEWIRE URO STR STIFF .035"X150CM NITINOL 150NSS35

## (undated) DEVICE — BASKET RETRIEVAL 1.9FRX120CM ESCAPE NTNL 4 WIRE 390-201

## (undated) DEVICE — RAD RX ISOVUE 300 (50ML) 61% IOPAMIDOL CHARGE PER ML

## (undated) RX ORDER — FENTANYL CITRATE 50 UG/ML
INJECTION, SOLUTION INTRAMUSCULAR; INTRAVENOUS
Status: DISPENSED
Start: 2019-02-07

## (undated) RX ORDER — LIDOCAINE HYDROCHLORIDE 10 MG/ML
INJECTION, SOLUTION EPIDURAL; INFILTRATION; INTRACAUDAL; PERINEURAL
Status: DISPENSED
Start: 2019-03-05

## (undated) RX ORDER — ONDANSETRON 2 MG/ML
INJECTION INTRAMUSCULAR; INTRAVENOUS
Status: DISPENSED
Start: 2019-01-17

## (undated) RX ORDER — LIDOCAINE HYDROCHLORIDE 10 MG/ML
INJECTION, SOLUTION EPIDURAL; INFILTRATION; INTRACAUDAL; PERINEURAL
Status: DISPENSED
Start: 2019-01-17

## (undated) RX ORDER — FENTANYL CITRATE 50 UG/ML
INJECTION, SOLUTION INTRAMUSCULAR; INTRAVENOUS
Status: DISPENSED
Start: 2019-03-05

## (undated) RX ORDER — DEXAMETHASONE SODIUM PHOSPHATE 4 MG/ML
INJECTION, SOLUTION INTRA-ARTICULAR; INTRALESIONAL; INTRAMUSCULAR; INTRAVENOUS; SOFT TISSUE
Status: DISPENSED
Start: 2019-03-05

## (undated) RX ORDER — NEOSTIGMINE METHYLSULFATE 1 MG/ML
VIAL (ML) INJECTION
Status: DISPENSED
Start: 2019-02-07

## (undated) RX ORDER — VASOPRESSIN 20 U/ML
INJECTION PARENTERAL
Status: DISPENSED
Start: 2019-01-17

## (undated) RX ORDER — GLYCOPYRROLATE 0.2 MG/ML
INJECTION INTRAMUSCULAR; INTRAVENOUS
Status: DISPENSED
Start: 2019-02-07

## (undated) RX ORDER — PROPOFOL 10 MG/ML
INJECTION, EMULSION INTRAVENOUS
Status: DISPENSED
Start: 2019-03-05

## (undated) RX ORDER — GLYCOPYRROLATE 0.2 MG/ML
INJECTION INTRAMUSCULAR; INTRAVENOUS
Status: DISPENSED
Start: 2019-01-17

## (undated) RX ORDER — PHENYLEPHRINE HCL IN 0.9% NACL 1 MG/10 ML
SYRINGE (ML) INTRAVENOUS
Status: DISPENSED
Start: 2019-01-17

## (undated) RX ORDER — PROPOFOL 10 MG/ML
INJECTION, EMULSION INTRAVENOUS
Status: DISPENSED
Start: 2019-01-17